# Patient Record
Sex: FEMALE | Race: WHITE | URBAN - METROPOLITAN AREA
[De-identification: names, ages, dates, MRNs, and addresses within clinical notes are randomized per-mention and may not be internally consistent; named-entity substitution may affect disease eponyms.]

---

## 2017-01-06 ENCOUNTER — PRENATAL OFFICE VISIT (OUTPATIENT)
Dept: MIDWIFE SERVICES | Facility: CLINIC | Age: 32
End: 2017-01-06
Payer: COMMERCIAL

## 2017-01-06 VITALS
OXYGEN SATURATION: 96 % | HEART RATE: 76 BPM | SYSTOLIC BLOOD PRESSURE: 106 MMHG | BODY MASS INDEX: 26.13 KG/M2 | DIASTOLIC BLOOD PRESSURE: 72 MMHG | WEIGHT: 157 LBS

## 2017-01-06 DIAGNOSIS — Z34.03 ENCOUNTER FOR SUPERVISION OF NORMAL FIRST PREGNANCY IN THIRD TRIMESTER: Primary | ICD-10-CM

## 2017-01-06 PROCEDURE — 99207 ZZC PRENATAL VISIT: CPT | Performed by: ADVANCED PRACTICE MIDWIFE

## 2017-01-10 NOTE — PROGRESS NOTES
Doing well.  No concerns.  Getting ready.  Discussed GBS and Hgb at next visit at 36 weeks.  Active fetus.   JE

## 2017-01-20 ENCOUNTER — PRENATAL OFFICE VISIT (OUTPATIENT)
Dept: MIDWIFE SERVICES | Facility: CLINIC | Age: 32
End: 2017-01-20
Payer: COMMERCIAL

## 2017-01-20 VITALS
HEART RATE: 100 BPM | BODY MASS INDEX: 26.46 KG/M2 | DIASTOLIC BLOOD PRESSURE: 73 MMHG | SYSTOLIC BLOOD PRESSURE: 109 MMHG | TEMPERATURE: 96.2 F | WEIGHT: 159 LBS

## 2017-01-20 DIAGNOSIS — Z34.03 ENCOUNTER FOR SUPERVISION OF NORMAL FIRST PREGNANCY IN THIRD TRIMESTER: Primary | ICD-10-CM

## 2017-01-20 LAB — HGB BLD-MCNC: 12.4 G/DL (ref 11.7–15.7)

## 2017-01-20 PROCEDURE — 00000218 ZZHCL STATISTIC OBHBG - HEMOGLOBIN: Performed by: ADVANCED PRACTICE MIDWIFE

## 2017-01-20 PROCEDURE — 87186 SC STD MICRODIL/AGAR DIL: CPT | Performed by: ADVANCED PRACTICE MIDWIFE

## 2017-01-20 PROCEDURE — 36416 COLLJ CAPILLARY BLOOD SPEC: CPT | Performed by: ADVANCED PRACTICE MIDWIFE

## 2017-01-20 PROCEDURE — 99207 ZZC PRENATAL VISIT: CPT | Performed by: ADVANCED PRACTICE MIDWIFE

## 2017-01-20 PROCEDURE — 87653 STREP B DNA AMP PROBE: CPT | Performed by: ADVANCED PRACTICE MIDWIFE

## 2017-01-20 NOTE — PROGRESS NOTES
35w5d  Patient feeling well. Positive fetal movement. Denies water leaking, vaginal bleeding, decreased fetal movement, contraction pain, or headaches.   Patient has FMLA paper to fill out today. Otherwise no concerns. Asking about what to do with cord prolapse at home, discussed that is very unlikely to happen. Discussed baby's position and cervical checks today as well.   Danger signs reviewed, pre-eclampsia signs and symptoms discussed.   Knows when to call triage and has phone numbers.   GBS and HGB today.   Follow up in 1 week.   Lola GARZA

## 2017-01-21 LAB
GP B STREP DNA SPEC QL NAA+PROBE: ABNORMAL
SPECIMEN SOURCE: ABNORMAL

## 2017-01-24 LAB
BACTERIA SPEC CULT: ABNORMAL
MICRO REPORT STATUS: ABNORMAL
MICROORGANISM SPEC CULT: ABNORMAL
SPECIMEN SOURCE: ABNORMAL

## 2017-01-25 PROBLEM — Z23 NEED FOR TDAP VACCINATION: Status: ACTIVE | Noted: 2017-01-25

## 2017-01-25 PROBLEM — Z34.00 SUPERVISION OF NORMAL FIRST PREGNANCY: Status: ACTIVE | Noted: 2017-01-25

## 2017-01-25 PROBLEM — O99.820 GBS (GROUP B STREPTOCOCCUS CARRIER), +RV CULTURE, CURRENTLY PREGNANT: Status: ACTIVE | Noted: 2017-01-25

## 2017-01-30 ENCOUNTER — PRENATAL OFFICE VISIT (OUTPATIENT)
Dept: MIDWIFE SERVICES | Facility: CLINIC | Age: 32
End: 2017-01-30
Payer: COMMERCIAL

## 2017-01-30 VITALS
DIASTOLIC BLOOD PRESSURE: 77 MMHG | BODY MASS INDEX: 26.79 KG/M2 | WEIGHT: 161 LBS | TEMPERATURE: 96.7 F | SYSTOLIC BLOOD PRESSURE: 116 MMHG | HEART RATE: 97 BPM

## 2017-01-30 DIAGNOSIS — Z34.03 ENCOUNTER FOR SUPERVISION OF NORMAL FIRST PREGNANCY IN THIRD TRIMESTER: Primary | ICD-10-CM

## 2017-01-30 PROCEDURE — 99207 ZZC PRENATAL VISIT: CPT | Performed by: ADVANCED PRACTICE MIDWIFE

## 2017-01-30 NOTE — PROGRESS NOTES
37w1d  Patient feeling well. Positive fetal movement. Denies water leaking, vaginal bleeding, decreased fetal movement, contraction pain, or headaches.   Discussed breastfeeding, birth control, and postpartum depression and anxiety. Patient has a family history of postpartum depression and herself has some anxiety but does not and has not ever needed treatment. Just worried about having to wait until an appointment is available for weeks. Option of getting a referral and making an appointment here for just in case or get a list of clinics that would be covered by her insurance to call to see if they have appointment available before [facility]. Patient also asking if she needs to get on a list for pelvic floor physical therapy but not having any issues and never had any issues with pelvic floor muscles. Discussed that is not routinely done and it would be recommended to just do Kegel exercises.   Discussed GBS positive.   Danger signs reviewed, pre-eclampsia signs and symptoms discussed.   Knows when to call triage and has phone numbers.   Follow up in 1 week.   Lola Rainey CNM

## 2017-02-08 ENCOUNTER — PRENATAL OFFICE VISIT (OUTPATIENT)
Dept: MIDWIFE SERVICES | Facility: CLINIC | Age: 32
End: 2017-02-08
Payer: COMMERCIAL

## 2017-02-08 VITALS
HEART RATE: 102 BPM | DIASTOLIC BLOOD PRESSURE: 79 MMHG | WEIGHT: 161.2 LBS | SYSTOLIC BLOOD PRESSURE: 113 MMHG | BODY MASS INDEX: 26.83 KG/M2

## 2017-02-08 DIAGNOSIS — Z34.03 ENCOUNTER FOR SUPERVISION OF NORMAL FIRST PREGNANCY IN THIRD TRIMESTER: Primary | ICD-10-CM

## 2017-02-08 PROCEDURE — 99207 ZZC PRENATAL VISIT: CPT | Performed by: ADVANCED PRACTICE MIDWIFE

## 2017-02-08 NOTE — PROGRESS NOTES
Feeling well, would really like to be able to know when she will go into labor but understands that it is not predictable. Baby is active, less patterned and this concerns her. Reassured her that overall movement is reassuring, not a concern if it has become less predictable in nature. No LOF or bleeding but has had an increase in discharge. No regular contractions. Baby very active throughout exam. RTC weekly. MML

## 2017-02-13 ENCOUNTER — PRENATAL OFFICE VISIT (OUTPATIENT)
Dept: MIDWIFE SERVICES | Facility: CLINIC | Age: 32
End: 2017-02-13
Payer: COMMERCIAL

## 2017-02-13 VITALS
WEIGHT: 163 LBS | HEART RATE: 90 BPM | TEMPERATURE: 96.5 F | BODY MASS INDEX: 27.12 KG/M2 | DIASTOLIC BLOOD PRESSURE: 75 MMHG | SYSTOLIC BLOOD PRESSURE: 120 MMHG

## 2017-02-13 DIAGNOSIS — Z34.03 ENCOUNTER FOR SUPERVISION OF NORMAL FIRST PREGNANCY IN THIRD TRIMESTER: Primary | ICD-10-CM

## 2017-02-13 PROCEDURE — 99207 ZZC PRENATAL VISIT: CPT | Performed by: ADVANCED PRACTICE MIDWIFE

## 2017-02-13 NOTE — PROGRESS NOTES
39w1d  Feeling well. Reports good fetal movement. Denies leaking of fluid, vaginal bleeding, regular uterine contractions, headache or other concerns. Request cervix check. Discussed postdates. RTC in 1 wk NAN

## 2017-02-13 NOTE — MR AVS SNAPSHOT
After Visit Summary   2/13/2017    Fifi Hook    MRN: [id number]           Patient Information     Date Of Birth          1985        Visit Information        Provider Department      2/13/2017 8:30 AM Kajal Santamaria APRN CNM [facility]        Today's Diagnoses     Encounter for supervision of normal first pregnancy in third trimester    -  1       Follow-ups after your visit        Your next 10 appointments already scheduled     Feb 20, 2017  8:15 AM CST   ESTABLISHED PRENATAL with AKIL Puga CNM   [facility] ([facility])    [address] [place] [address] [phone]              Who to contact     If you have questions or need follow up information about today's clinic visit or your schedule please contact [facility] directly at [phone].  Normal or non-critical lab and imaging results will be communicated to you by MyChart, letter or phone within 4 business days after the clinic has received the results. If you do not hear from us within 7 days, please contact the clinic through [employer] or phone. If you have a critical or abnormal lab result, we will notify you by phone as soon as possible.  Submit refill requests through [employer] or call your pharmacy and they will forward the refill request to us. Please allow 3 business days for your refill to be completed.          Additional Information About Your Visit        MyChart Information     [employer] gives you secure access to your electronic health record. If you see a primary care provider, you can also send messages to your care team and make appointments. If you have questions, please call your primary care clinic.  If you do not have a primary care provider, please call [phone] and they will assist you.        Care EveryWhere ID     This is your Care EveryWhere ID. This could be used by other organizations  to access your [place] medical records  [id number]        Your Vitals Were     Pulse Temperature Last Period BMI (Body Mass Index)          90 96.5  F (35.8  C) (Oral) 05/15/2016 27.12 kg/m2         Blood Pressure from Last 3 Encounters:   02/13/17 120/75   02/08/17 113/79   01/30/17 116/77    Weight from Last 3 Encounters:   02/13/17 163 lb (73.9 kg)   02/08/17 161 lb 3.2 oz (73.1 kg)   01/30/17 161 lb (73 kg)              Today, you had the following     No orders found for display       Primary Care Provider    None Specified       No primary provider on file.        Thank you!     Thank you for choosing [facility]  for your care. Our goal is always to provide you with excellent care. Hearing back from our patients is one way we can continue to improve our services. Please take a few minutes to complete the written survey that you may receive in the mail after your visit with us. Thank you!             Your Updated Medication List - Protect others around you: Learn how to safely use, store and throw away your medicines at www.disposemymeds.org.          This list is accurate as of: 2/13/17 12:10 PM.  Always use your most recent med list.                   Brand Name Dispense Instructions for use    PRENATAL 1 PO      1 tablet daily

## 2017-02-20 ENCOUNTER — TELEPHONE (OUTPATIENT)
Dept: MIDWIFE SERVICES | Facility: CLINIC | Age: 32
End: 2017-02-20

## 2017-02-20 ENCOUNTER — PRENATAL OFFICE VISIT (OUTPATIENT)
Dept: MIDWIFE SERVICES | Facility: CLINIC | Age: 32
End: 2017-02-20
Payer: COMMERCIAL

## 2017-02-20 VITALS
BODY MASS INDEX: 27.64 KG/M2 | HEIGHT: 65 IN | DIASTOLIC BLOOD PRESSURE: 85 MMHG | SYSTOLIC BLOOD PRESSURE: 116 MMHG | WEIGHT: 165.9 LBS | HEART RATE: 102 BPM

## 2017-02-20 DIAGNOSIS — Z34.03 ENCOUNTER FOR SUPERVISION OF NORMAL FIRST PREGNANCY IN THIRD TRIMESTER: ICD-10-CM

## 2017-02-20 PROCEDURE — 99207 ZZC PRENATAL VISIT: CPT | Performed by: ADVANCED PRACTICE MIDWIFE

## 2017-02-20 NOTE — PROGRESS NOTES
40w1d   Patient feeling well. Positive fetal movement. Denies water leaking, vaginal bleeding, decreased fetal movement, contraction pain, or headaches.   Patient is feeling really well still. Going to work everyday. Worried about her water breaking at work. We discussed this is more uncommon. Okay with going postdates. Thinking about asking for an IOL at 10 days past her due date.   Danger signs reviewed, pre-eclampsia signs and symptoms discussed.   Knows when to call triage and has phone numbers.   Follow up Friday for NST and ELSIE for postdates.   Lola Rainey CNM

## 2017-02-20 NOTE — TELEPHONE ENCOUNTER
Patient scheduled for appts on Friday. Left message to call clinic to confirm. Only time available for US.  Valerie Moon

## 2017-02-20 NOTE — TELEPHONE ENCOUNTER
----- Message from AKIL Puga CNJESSEE sent at 2/20/2017  8:30 AM CST -----  Hello,   This patient said she was unable to make an appointment Friday since it was full.   Could you help coordinate her getting an ELSIE postdates ultrasound, NST, and CNM visit Friday. Hopefully the on call will be able to come over quick if we are full.   Thanks, Julia

## 2017-02-20 NOTE — MR AVS SNAPSHOT
After Visit Summary   2/20/2017    Fifi Hook    MRN: [id number]           Patient Information     Date Of Birth          1985        Visit Information        Provider Department      2/20/2017 8:15 AM Lola Rainey APRN CNM [facility]        Today's Diagnoses     Encounter for supervision of normal first pregnancy in third trimester           Follow-ups after your visit        Your next 10 appointments already scheduled     Feb 24, 2017  8:00 AM CST   US OB SINGLE FOLLOW UP REPEAT with RDUS1   [facility] ([facility])    [address] [place] [address] [address]  [facility] [address] [phone]           Please bring a list of your medicines (including vitamins, minerals and over-the-counter drugs). Also, tell your doctor about any allergies you may have. Wear comfortable clothes and leave your valuables at home.  If you re less than 20 weeks drink four 8-ounce glasses of fluid an hour before your exam. If you need to empty your bladder before your exam, try to release only a little urine. Then, drink another glass of fluid.  You may have up to two family members in the exam room. If you bring a small child, an adult must be there to care for him or her.  Please call the Imaging Department at your exam site with any questions.            Feb 24, 2017  9:15 AM CST   ESTABLISHED PRENATAL with AKIL Kelley CNM   [facility] ([facility])    [address] [place] [address] [address]  [facility] [id number] [phone]            Feb 27, 2017  8:45 AM CST   ESTABLISHED PRENATAL with AKIL Puga CNM   [facility] ([facility])    [address] [place] [address] [address]  [facility] [id number] [phone]              Future tests that were ordered for you today     Open Future Orders        Priority Expected Expires Ordered    US OB Ltd One Or More Fetus  "FU/Repeat Routine 5/21/2017 2/20/2018 2/20/2017            Who to contact     If you have questions or need follow up information about today's clinic visit or your schedule please contact [facility] directly at [phone].  Normal or non-critical lab and imaging results will be communicated to you by MyChart, letter or phone within 4 business days after the clinic has received the results. If you do not hear from us within 7 days, please contact the clinic through [employer] or phone. If you have a critical or abnormal lab result, we will notify you by phone as soon as possible.  Submit refill requests through [employer] or call your pharmacy and they will forward the refill request to us. Please allow 3 business days for your refill to be completed.          Additional Information About Your Visit        [employer] Information     [employer] gives you secure access to your electronic health record. If you see a primary care provider, you can also send messages to your care team and make appointments. If you have questions, please call your primary care clinic.  If you do not have a primary care provider, please call [phone] and they will assist you.        Care EveryWhere ID     This is your Care EveryWhere ID. This could be used by other organizations to access your [facility] medical records  [id number]        Your Vitals Were     Pulse Height Last Period BMI (Body Mass Index)          102 5' 5\" (1.651 m) 05/15/2016 27.61 kg/m2         Blood Pressure from Last 3 Encounters:   02/20/17 116/85   02/13/17 120/75   02/08/17 113/79    Weight from Last 3 Encounters:   02/20/17 165 lb 14.4 oz (75.3 kg)   02/13/17 163 lb (73.9 kg)   02/08/17 161 lb 3.2 oz (73.1 kg)               Primary Care Provider    None Specified       No primary provider on file.        Thank you!     Thank you for choosing [facility]  for your care. Our goal is always to provide you with excellent care. Hearing back " from our patients is one way we can continue to improve our services. Please take a few minutes to complete the written survey that you may receive in the mail after your visit with us. Thank you!             Your Updated Medication List - Protect others around you: Learn how to safely use, store and throw away your medicines at www.disposemymeds.org.          This list is accurate as of: 2/20/17  8:47 AM.  Always use your most recent med list.                   Brand Name Dispense Instructions for use    PRENATAL 1 PO      1 tablet daily

## 2017-02-23 ENCOUNTER — TELEPHONE (OUTPATIENT)
Dept: MIDWIFE SERVICES | Facility: CLINIC | Age: 32
End: 2017-02-23

## 2017-02-23 ENCOUNTER — TELEPHONE (OUTPATIENT)
Dept: NURSING | Facility: CLINIC | Age: 32
End: 2017-02-23
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
## 2017-02-24 ENCOUNTER — TELEPHONE (OUTPATIENT)
Dept: NURSING | Facility: CLINIC | Age: 32
End: 2017-02-24

## 2017-02-24 ENCOUNTER — HOSPITAL ENCOUNTER (INPATIENT)
Facility: CLINIC | Age: 32
LOS: 4 days | Discharge: HOME | End: 2017-02-28
Attending: ADVANCED PRACTICE MIDWIFE | Admitting: OBSTETRICS & GYNECOLOGY
Payer: COMMERCIAL

## 2017-02-24 ENCOUNTER — HOSPITAL ENCOUNTER (OUTPATIENT)
Facility: CLINIC | Age: 32
Discharge: HOME | End: 2017-02-24
Attending: ADVANCED PRACTICE MIDWIFE | Admitting: ADVANCED PRACTICE MIDWIFE
Payer: COMMERCIAL

## 2017-02-24 VITALS — TEMPERATURE: 97.7 F | SYSTOLIC BLOOD PRESSURE: 118 MMHG | RESPIRATION RATE: 18 BRPM | DIASTOLIC BLOOD PRESSURE: 84 MMHG

## 2017-02-24 DIAGNOSIS — O47.9 THREATENED LABOR AT TERM: Primary | ICD-10-CM

## 2017-02-24 DIAGNOSIS — Z48.89 ENCOUNTER FOR POSTOPERATIVE CARE: Primary | ICD-10-CM

## 2017-02-24 PROCEDURE — 99213 OFFICE O/P EST LOW 20 MIN: CPT | Mod: 25 | Performed by: ADVANCED PRACTICE MIDWIFE

## 2017-02-24 PROCEDURE — 85025 COMPLETE CBC W/AUTO DIFF WBC: CPT | Performed by: ADVANCED PRACTICE MIDWIFE

## 2017-02-24 PROCEDURE — 12000030 ZZH R&B OB INTERMEDIATE UMMC

## 2017-02-24 PROCEDURE — 86900 BLOOD TYPING SEROLOGIC ABO: CPT | Performed by: ADVANCED PRACTICE MIDWIFE

## 2017-02-24 PROCEDURE — 99215 OFFICE O/P EST HI 40 MIN: CPT

## 2017-02-24 PROCEDURE — 25000128 H RX IP 250 OP 636: Performed by: ADVANCED PRACTICE MIDWIFE

## 2017-02-24 PROCEDURE — 25000132 ZZH RX MED GY IP 250 OP 250 PS 637: Performed by: ADVANCED PRACTICE MIDWIFE

## 2017-02-24 PROCEDURE — 86850 RBC ANTIBODY SCREEN: CPT | Performed by: ADVANCED PRACTICE MIDWIFE

## 2017-02-24 PROCEDURE — 86901 BLOOD TYPING SEROLOGIC RH(D): CPT | Performed by: ADVANCED PRACTICE MIDWIFE

## 2017-02-24 PROCEDURE — 59025 FETAL NON-STRESS TEST: CPT | Mod: 26 | Performed by: ADVANCED PRACTICE MIDWIFE

## 2017-02-24 PROCEDURE — 99213 OFFICE O/P EST LOW 20 MIN: CPT

## 2017-02-24 RX ORDER — ONDANSETRON 2 MG/ML
4 INJECTION INTRAMUSCULAR; INTRAVENOUS EVERY 6 HOURS PRN
Status: DISCONTINUED | OUTPATIENT
Start: 2017-02-24 | End: 2017-02-26

## 2017-02-24 RX ORDER — OXYTOCIN 10 [USP'U]/ML
10 INJECTION, SOLUTION INTRAMUSCULAR; INTRAVENOUS
Status: DISCONTINUED | OUTPATIENT
Start: 2017-02-24 | End: 2017-02-26

## 2017-02-24 RX ORDER — ONDANSETRON 2 MG/ML
4 INJECTION INTRAMUSCULAR; INTRAVENOUS EVERY 6 HOURS PRN
Status: DISCONTINUED | OUTPATIENT
Start: 2017-02-24 | End: 2017-02-24 | Stop reason: HOSPADM

## 2017-02-24 RX ORDER — OXYCODONE AND ACETAMINOPHEN 5; 325 MG/1; MG/1
1 TABLET ORAL
Status: DISCONTINUED | OUTPATIENT
Start: 2017-02-24 | End: 2017-02-26

## 2017-02-24 RX ORDER — IBUPROFEN 800 MG/1
800 TABLET, FILM COATED ORAL
Status: DISCONTINUED | OUTPATIENT
Start: 2017-02-24 | End: 2017-02-26

## 2017-02-24 RX ORDER — CARBOPROST TROMETHAMINE 250 UG/ML
250 INJECTION, SOLUTION INTRAMUSCULAR
Status: DISCONTINUED | OUTPATIENT
Start: 2017-02-24 | End: 2017-02-26

## 2017-02-24 RX ORDER — NALOXONE HYDROCHLORIDE 0.4 MG/ML
.1-.4 INJECTION, SOLUTION INTRAMUSCULAR; INTRAVENOUS; SUBCUTANEOUS
Status: DISCONTINUED | OUTPATIENT
Start: 2017-02-24 | End: 2017-02-25

## 2017-02-24 RX ORDER — HYDROXYZINE HYDROCHLORIDE 50 MG/1
100 TABLET, FILM COATED ORAL EVERY 6 HOURS PRN
Status: DISCONTINUED | OUTPATIENT
Start: 2017-02-24 | End: 2017-02-24 | Stop reason: HOSPADM

## 2017-02-24 RX ORDER — METHYLERGONOVINE MALEATE 0.2 MG/ML
200 INJECTION INTRAVENOUS
Status: DISCONTINUED | OUTPATIENT
Start: 2017-02-24 | End: 2017-02-26

## 2017-02-24 RX ORDER — HYDROXYZINE HYDROCHLORIDE 50 MG/1
50-100 TABLET, FILM COATED ORAL EVERY 6 HOURS PRN
Qty: 15 TABLET | Refills: 0 | Status: ON HOLD | OUTPATIENT
Start: 2017-02-24 | End: 2017-02-27

## 2017-02-24 RX ORDER — ACETAMINOPHEN 325 MG/1
650 TABLET ORAL EVERY 4 HOURS PRN
Status: DISCONTINUED | OUTPATIENT
Start: 2017-02-24 | End: 2017-02-26

## 2017-02-24 RX ORDER — SODIUM CHLORIDE, SODIUM LACTATE, POTASSIUM CHLORIDE, CALCIUM CHLORIDE 600; 310; 30; 20 MG/100ML; MG/100ML; MG/100ML; MG/100ML
INJECTION, SOLUTION INTRAVENOUS CONTINUOUS
Status: DISCONTINUED | OUTPATIENT
Start: 2017-02-24 | End: 2017-02-26

## 2017-02-24 RX ORDER — PENICILLIN G POTASSIUM 5000000 [IU]/1
5 INJECTION, POWDER, FOR SOLUTION INTRAMUSCULAR; INTRAVENOUS ONCE
Status: COMPLETED | OUTPATIENT
Start: 2017-02-24 | End: 2017-02-24

## 2017-02-24 RX ORDER — OXYTOCIN/0.9 % SODIUM CHLORIDE 30/500 ML
100-340 PLASTIC BAG, INJECTION (ML) INTRAVENOUS CONTINUOUS PRN
Status: COMPLETED | OUTPATIENT
Start: 2017-02-24 | End: 2017-02-25

## 2017-02-24 RX ADMIN — PENICILLIN G POTASSIUM 5 MILLION UNITS: 5000000 POWDER, FOR SOLUTION INTRAMUSCULAR; INTRAPLEURAL; INTRATHECAL; INTRAVENOUS at 23:53

## 2017-02-24 RX ADMIN — HYDROXYZINE HYDROCHLORIDE 100 MG: 50 TABLET, FILM COATED ORAL at 05:59

## 2017-02-24 NOTE — IP AVS SNAPSHOT
MRN:[id number]                      After Visit Summary   2/24/2017    Fifi Hook    MRN: [id number]           Thank you!     Thank you for choosing [facility] for your care. Our goal is always to provide you with excellent care. Hearing back from our patients is one way we can continue to improve our services. Please take a few minutes to complete the written survey that you may receive in the mail after you visit with us. Thank you!        Patient Information     Date Of Birth          1985        About your hospital stay     You were admitted on:  February 24, 2017 You last received care in the:  [facility]    You were discharged on:  February 24, 2017       Who to Call     For medical emergencies, please call 911.  For non-urgent questions about your medical care, please call your primary care provider or clinic, None          Attending Provider     Provider Specialty    Libby Smith, [facility]    [place], [facility]       Primary Care Provider    None Specified       No primary provider on file.        Your next 10 appointments already scheduled     Feb 24, 2017 12:30 PM CST   US OB SINGLE FOLLOW UP REPEAT with FKUS1   [facility]    [address] [place] [address] [phone]           Please bring a list of your medicines (including vitamins, minerals and over-the-counter drugs). Also, tell your doctor about any allergies you may have. Wear comfortable clothes and leave your valuables at home.  If you re less than 20 weeks drink four 8-ounce glasses of fluid an hour before your exam. If you need to empty your bladder before your exam, try to release only a little urine. Then, drink another glass of fluid.  You may have up to two family members in the exam room. If you bring a small child, an adult must be there to care for him or her.  Please call the Imaging Department at your exam site with  any questions.            Feb 24, 2017  2:00 PM CST   ESTABLISHED PRENATAL with RD NON STRESS TEST RM   [facility] ([facility])    [address]  [address]  [facility] [address]   [phone]            Feb 24, 2017  2:30 PM CST   ESTABLISHED PRENATAL with AKIL Kelley CNM   [facility] ([facility])    [address]  [address]  [facility] [address]   [phone]            Feb 27, 2017  8:45 AM CST   ESTABLISHED PRENATAL with AKIL Puga CNM   [facility] ([facility])    [address] [facility] [address]  [facility] [address]   [phone]              Further instructions from your care team       Discharge Instruction for Undelivered Patients      You were seen for: Labor Assessment  We Consulted: ANSHU Smith  You had (Test or Medicine):External Fetal Monitoring     Diet:   Drink 8 to 12 glasses of liquids (milk, juice, water) every day.  You may eat meals and snacks.     Activity:  Call your doctor or nurse midwife if your baby is moving less than usual.     Call your provider if you notice:  Swelling in your face or increased swelling in your hands or legs.  Headaches that are not relieved by Tylenol (acetaminophen).  Changes in your vision (blurring: seeing spots or stars.)  Nausea (sick to your stomach) and vomiting (throwing up).   Weight gain of 5 pounds or more per week.  Heartburn that doesn't go away.  Signs of bladder infection: pain when you urinate (use the toilet), need to go more often and more urgently.  The bag of broussard (rupture of membranes) breaks, or you notice leaking in your underwear.  Bright red blood in your underwear.  Abdominal (lower belly) or stomach pain.  For first baby: Contractions (tightening) less than 5 minutes apart for one hour or more.  Increase or change in vaginal discharge (note the color and amount)      Follow-up:  As  scheduled in the clinic          Pending Results     No orders found from 2/22/2017 to 2/25/2017.            Statement of Approval     Ordered          02/24/17 0556  I have reviewed and agree with all the recommendations and orders detailed in this document.  EFFECTIVE NOW     Approved and electronically signed by:  Libby Smith CNM             Admission Information     Date & Time Provider Department Dept. Phone    2/24/2017 Libby Smith CNM [facility] [phone]      Your Vitals Were     Blood Pressure Temperature Respirations Last Period          118/84 97.7  F (36.5  C) (Oral) 18 05/15/2016        [employer] Information     [employer] gives you secure access to your electronic health record. If you see a primary care provider, you can also send messages to your care team and make appointments. If you have questions, please call your primary care clinic.  If you do not have a primary care provider, please call [phone] and they will assist you.        Care EveryWhere ID     This is your Care EveryWhere ID. This could be used by other organizations to access your [facility] medical records  [id number]           Review of your medicines      START taking        Dose / Directions    hydrOXYzine 50 MG tablet   Commonly known as:  ATARAX        Dose:   mg   Take 1-2 tablets ( mg) by mouth every 6 hours as needed for other (nervousness or restlessness/sleep)   Quantity:  15 tablet   Refills:  0         CONTINUE these medicines which have NOT CHANGED        Dose / Directions    PRENATAL 1 PO        Dose:  1 tablet   1 tablet daily   Refills:  0            Where to get your medicines      These medications were sent to [facility] [address] [address]     Phone:  [phone]     hydrOXYzine 50 MG tablet                Protect others around you: Learn how to safely use, store and throw away your medicines at  www.disposemymeds.org.             Medication List: This is a list of all your medications and when to take them. Check marks below indicate your daily home schedule. Keep this list as a reference.      Medications           Morning Afternoon Evening Bedtime As Needed    hydrOXYzine 50 MG tablet   Commonly known as:  ATARAX   Take 1-2 tablets ( mg) by mouth every 6 hours as needed for other (nervousness or restlessness/sleep)   Last time this was given:  100 mg on 2/24/2017  5:59 AM                                PRENATAL 1 PO   1 tablet daily

## 2017-02-24 NOTE — IP AVS SNAPSHOT
MRN:[id number]                      After Visit Summary   2017    Fifi Hook    MRN: [id number]           Thank you!     Thank you for choosing [facility] for your care. Our goal is always to provide you with excellent care. Hearing back from our patients is one way we can continue to improve our services. Please take a few minutes to complete the written survey that you may receive in the mail after you visit with us. Thank you!        Patient Information     Date Of Birth          1985        About your hospital stay     You were [facility] on:  2017 [facility]    You were discharged on:  2017        Reason for your hospital stay       You were in the hospital for delivery of your baby                  Who to Call     For medical emergencies, please call 911.  For non-urgent questions about your medical care, please call your primary care provider or clinic, None  For questions related to your surgery, please call your surgery clinic        Attending Provider     Provider Specialty    Isamar lAdridge APRN CNM Midwives Lehmann, Molly Mae, APRN CNM MIDWIFE    Agnes Seals MD OB/Gyn       Primary Care Provider    None Specified       No primary provider on file.         When to contact your care team       - Fever >100.4  - Severe abdominal pain  - Heavy vaginal bleeding                  After Care Instructions     Activity       Review discharge instructions            Diet       Resume previous diet            Discharge Instructions - Postpartum visit       Schedule postpartum visit with your provider and return to clinic in 6 weeks.                  Follow-up Appointments     Follow Up and recommended labs and tests       Follow up in 6 weeks for postpartum visit in clinic with OB provider                  Further instructions from your care team       Postop  Birth Instructions    Activity       Do  not lift more than 10 pounds for 6 weeks after surgery.  Ask family and friends for help when you need it.    No driving until you have stopped taking your pain medications (usually two weeks after surgery).    No heavy exercise or activity for 6 weeks.  Don't do anything that will put a strain on your surgery site.    Don't strain when using the toilet.  Your care team may prescribe a stool softener if you have problems with your bowel movements.     To care for your incision:       Keep the incision clean and dry.    Do not soak your incision in water. No swimming or hot tubs until it has fully healed. You may soak in the bathtub if the water level is below your incision.    Do not use peroxide, gel, cream, lotion, or ointment on your incision.    Adjust your clothes to avoid pressure on your surgery site (check the elastic in your underwear for example).     You may see a small amount of clear or pink drainage and this is normal.  Check with your health care provider:       If the drainage increases or has an odor.    If the incision reddens, you have swelling, or develop a rash.    If you have increased pain and the medicine we prescribed doesn't help.    If you have a fever above 100.4 F (38 C) with or without chills when placing thermometer under your tongue.   The area around your incision (surgery wound), will feel numb.  This is normal. The numbness should go away in less than a year.     Keep your hands clean:  Always wash your hands before touching your incision (surgery wound). This helps reduce your risk of infection. If your hands aren't dirty, you may use an alcohol hand-rub to clean your hands. Keep your nails clean and short.    Call your healthcare provider if you have any of these symptoms:       You soak a sanitary pad with blood within 1 hour, or you see blood clots larger than a golf ball.    Bleeding that lasts more than 6 weeks.    Vaginal discharge that smells bad.    Severe pain, cramping  or tenderness in your lower belly area.    A need to urinate more frequently (use the toilet more often), more urgently (use the toilet very quickly), or it burns when you urinate.    Nausea and vomiting.    Redness, swelling or pain around a vein in your leg.    Problems breastfeeding or a red or painful area on your breast.    Chest pain and cough or are gasping for air.    Problems with coping with sadness, anxiety or depression. If you have concerns about hurting yourself or the baby, call your provider immediately.      You have questions or concerns after you return home.                  Pending Results     No orders found from 2/22/2017 to 2/25/2017.            Statement of Approval     Ordered          02/28/17 1005  I have reviewed and agree with all the recommendations and orders detailed in this document.  EFFECTIVE NOW     Approved and electronically signed by:  Deisy Oconnell MD             Admission Information     Date & Time Provider Department Dept. Phone    2/24/2017 Agnes Seals MD [facility] [phone]      Your Vitals Were     Blood Pressure Pulse Temperature Respirations Last Period Pulse Oximetry    120/88 93 98.2  F (36.8  C) (Oral) 18 05/15/2016 98%      MyChart Information     [facility] gives you secure access to your electronic health record. If you see a primary care provider, you can also send messages to your care team and make appointments. If you have questions, please call your primary care clinic.  If you do not have a primary care provider, please call [phone] and they will assist you.        Care EveryWhere ID     This is your Care EveryWhere ID. This could be used by other organizations to access your [facility] medical records  [id number]           Review of your medicines      START taking        Dose / Directions    acetaminophen 325 MG tablet   Commonly known as:  TYLENOL        Dose:  650 mg   Take 2 tablets (650 mg) by mouth every 4 hours as needed for  other (surgical pain)   Quantity:  50 tablet   Refills:  0       ferrous sulfate 325 (65 FE) MG tablet   Commonly known as:  IRON        Dose:  325 mg   Take 1 tablet (325 mg) by mouth daily (with breakfast)   Quantity:  60 tablet   Refills:  0       ibuprofen 600 MG tablet   Commonly known as:  ADVIL/MOTRIN        Dose:  600 mg   Take 1 tablet (600 mg) by mouth every 6 hours as needed for other (cramping)   Quantity:  40 tablet   Refills:  0       oxyCODONE 5 MG IR tablet   Commonly known as:  ROXICODONE        Dose:  5-10 mg   Take 1-2 tablets (5-10 mg) by mouth every 4 hours as needed for moderate to severe pain   Quantity:  20 tablet   Refills:  0       senna-docusate 8.6-50 MG per tablet   Commonly known as:  SENOKOT-S;PERICOLACE        Dose:  1-2 tablet   Take 1-2 tablets by mouth 2 times daily   Quantity:  60 tablet   Refills:  0         CONTINUE these medicines which have NOT CHANGED        Dose / Directions    PRENATAL 1 PO        Dose:  1 tablet   1 tablet daily   Refills:  0         STOP taking     hydrOXYzine 50 MG tablet   Commonly known as:  ATARAX                Where to get your medicines      These medications were sent to [facility] [place], [place] - [address]  [address] [address]     Phone:  [phone]     acetaminophen 325 MG tablet    ferrous sulfate 325 (65 FE) MG tablet    ibuprofen 600 MG tablet    senna-docusate 8.6-50 MG per tablet         Some of these will need a paper prescription and others can be bought over the counter. Ask your nurse if you have questions.     Bring a paper prescription for each of these medications     oxyCODONE 5 MG IR tablet                Protect others around you: Learn how to safely use, store and throw away your medicines at www.disposemymeds.org.             Medication List: This is a list of all your medications and when to take them. Check marks below indicate your daily home schedule. Keep this list as a  reference.      Medications           Morning Afternoon Evening Bedtime As Needed    acetaminophen 325 MG tablet   Commonly known as:  TYLENOL   Take 2 tablets (650 mg) by mouth every 4 hours as needed for other (surgical pain)   Last time this was given:  975 mg on 2/28/2017  6:30 AM                                ferrous sulfate 325 (65 FE) MG tablet   Commonly known as:  IRON   Take 1 tablet (325 mg) by mouth daily (with breakfast)                                ibuprofen 600 MG tablet   Commonly known as:  ADVIL/MOTRIN   Take 1 tablet (600 mg) by mouth every 6 hours as needed for other (cramping)   Last time this was given:  800 mg on 2/28/2017  5:20 AM                                oxyCODONE 5 MG IR tablet   Commonly known as:  ROXICODONE   Take 1-2 tablets (5-10 mg) by mouth every 4 hours as needed for moderate to severe pain   Last time this was given:  5 mg on 2/28/2017 12:07 AM                                PRENATAL 1 PO   1 tablet daily                                senna-docusate 8.6-50 MG per tablet   Commonly known as:  SENOKOT-S;PERICOLACE   Take 1-2 tablets by mouth 2 times daily   Last time this was given:  2 tablets on 2/27/2017  7:37 PM

## 2017-02-24 NOTE — PROGRESS NOTES
Fifi Hook is a 31 year old female,  ,         Patient's last menstrual period was 05/15/2016.. Estimated Date of Delivery: 2017 is calculated from early U/S and LMP.      [facility] for evaluation of uterine contractions    HPI Contractions started last night and have continued to become stronger. She has gotten very little sleep.  Contractions have been frequent since they started.       PRENATAL COURSE  Prenatal care began at 9 wks gestation for a total of 10 prenatal visits.  Total wt gain 30 lbs  Prenatal vital signs WNL  Prenatal course was essentially uncomplicated    HISTORIES  No Known Allergies  Past Medical History   Diagnosis Date     Patient denies medical problems      Past Surgical History   Procedure Laterality Date     C oral surgery procedure       Extraction(s) dental       Family History   Problem Relation Age of Onset     Depression Mother      Depression Maternal Grandmother      Asthma Brother      DIABETES Maternal Uncle      Breast Cancer No family hx of      Colon Cancer No family hx of      Social History   Substance Use Topics     Smoking status: Never Smoker     Smokeless tobacco: Never Used     Alcohol use No       LABS:       Lab Results   Component Value Date    ABO A 2016       Lab Results   Component Value Date    RH Pos 2016     Rhogam not indicated  Rubella immune   Treponema Pallidum Antibody  Negative    HIV    Non-reactive  Lab Results   Component Value Date    HGB 12.4 2017      Lab Results   Component Value Date    HEPBANG negative 2016     Lab Results   Component Value Date    GBS  2017     Positive  Positive: GBS DNA detected, presumed positive for GBS.   Assay performed on incubated broth culture of specimen using [facility] real-time   PCR.         ROS  C: NEGATIVE for fever, chills, change in weight  I: NEGATIVE for worrisome rashes, moles or lesions  E/M: NEGATIVE for ear,  mouth and throat problems  R: NEGATIVE for significant cough or SOB  CV: NEGATIVE for chest pain, palpitations or peripheral edema  GI: NEGATIVE for nausea, abdominal pain, heartburn, or change in bowel habits  : NEGATIVE for frequency, dysuria, or hematuria  PSYCHIATRIC:  NEGATIVE for depression, anxiety or other mental health concerns      PHYSICAL EXAM:  BP (!) 131/91  Temp 97.7  F (36.5  C) (Oral)  Resp 18  LMP 05/15/2016  BP retaken:  118/84 WNL.  1st BP taken when she arrived and was distressed with contractions.  Denies any headache, visual disturbances or epigastric pain.    General appearance healthy, alert, active, mild distress and cooperative  Neuro:  denies headache and visual disturbances  Psych: Mentation normal and bright   Legs: 2+/2+, no clonus, no edema       Abdomen: gravid, single vertex fetus, non-tender, EFW 8 lbs. Pt is sherin every 2-3 minutes, lasting 40-60 seconds and palpates mild    FETAL HEART TONES: baseline 130 with moderate FHR variability and accelerations.  No decelerations present.     MEMBRANES: Membranes are intact    PELVIC EXAM: 2/ 70% / -3  BLOODY SHOW:: yes small amount with exam    ASSESSMENT:  IUP @ 40w5d  gestation in in latent labor  NST  reactive        PLAN:  Discussed options of staying going home in early labor or option of therapeutic sleep here and the hospital with possible pitocin augmentation.  She would like to go home.  Medications given vistaril one dose, Prescriptions given - vistaril   I will let Friday CNM know that they are in early labor.  They will keep in touch touch. They do not plan to go to [facility] for ELSIE today as they are in early labor.      Libby Smith, DNP, APRN, CNM

## 2017-02-24 NOTE — TELEPHONE ENCOUNTER
"Call Type: Triage Call    Presenting Problem: Patient is \"41 weeks pregnant,\" BRANDON is  02/19/2017. Patient's is having contractions that are 2-3 minutes  apart. Triager called out to answering service, left message for on  call provider to call patient at [phone]. Triaged for pregnancy:  signs of labor, 37 weeks or greater/Disposition to call provider  immediately.  Triage Note:  Guideline Title: Pregnancy: Signs of Labor, 37 Weeks or Greater  Recommended Disposition: Call Provider Immediately  Original Inclination: Wanted to speak with a nurse  Override Disposition:  Intended Action: Call PCP/HCP  Physician Contacted: No  First childbirth with regular contractions for 1 hour and contractions are  feeling stronger and closer together. ?  YES  New or worsening signs and symptoms that may indicate shock ? NO  Feeling of baby coming or wanting to push (urge to bear down) ? NO  Umbilical cord or any part of the baby (head, bottom, arm or leg) at the opening  of the vagina ? NO  Gestation 20 to 37 weeks ? NO  Gush or leakage of green or green-tinged or port-wine colored fluid (reddish and  watery) from the vagina ? NO  Decreased fetal movement (less than 10 kicks/movements within two hours or a  significant change in usual pattern compared to previous days) ? NO  No relief between contractions ? NO  Continuous bright red vaginal bleeding for more than 15 minutes (more than  spotting) ? NO  Physician Instructions:  Care Advice: Lie on left side, if possible.  CAUTIONS  SYMPTOM / CONDITION MANAGEMENT  See another provider immediately if unable to talk with your provider  within 1 hour. Follow the directions from your provider's on call resource  if you are unable to speak to your provider directly.  You may be directed  to go to the hospital's Labor & Delivery department for evaluation. Another  adult should drive.  May have clear liquids (such as water, clear fruit juices without pulp,  soda, tea or coffee without dairy " or non-dairy creamer, clear broth or  bouillon, oral hydration solution, or plain gelatin, fruit ices/popsicles,  hard candy) but do not eat solid foods before talking with your provider.  Call  if any of these occur: profuse bright red vaginal bleeding  continuous (without relaxation) abdominal pain  the umbilical cord or any fetal part in vagina  bag of broussard coming through vagina  feeling of wanting to push or have a bowel movement.

## 2017-02-24 NOTE — PLAN OF CARE
Data: [facility] at 0448.   Reason for maternal/fetal assessment per patient is Contractions (stronger at midnight)  . Patient is a . Prenatal record reviewed.      Obstetric History       T0      TAB0   SAB0   E0   M0   L0       # Outcome Date GA Lbr Quinton/2nd Weight Sex Delivery Anes PTL Lv   1 Current                  Medical History:   Past Medical History   Diagnosis Date     Patient denies medical problems    . Gestational Age 40w5d. VSS. Cervix: dilated to 2.  Fetal movement present. Patient denies backache, vaginal discharge, pelvic pressure, UTI symptoms, GI problems, bloody show, vaginal bleeding, edema, headache, visual disturbances, epigastric or URQ pain, abdominal pain, rupture of membranes. Support person is present.  Action: Verbal consent for EFM. Triage assessment completed. EFM applied for fetal well being. Uterine assessment pt sherin every 2-3 minutes. Fetal assessment: Presumed adequate fetal oxygenation documented (see flow record). Patient education pamphlets given on fetal movement counts and when to call the clinic with signs of labor. Patient instructed to report change in fetal movement, vaginal leaking of fluid or bleeding, abdominal pain, or any concerns related to the pregnancy to her nurse/physician.   Response: Dr. Smith informed of pt's arrival, contraction pattern, baby's initially with minimal variability but after postion change and oral fluids became reactive. Plan per provider is to discharge pt home with visteral. Patient verbalized understanding of education and verbalized agreement with plan. Discharged ambulatory at 0613.

## 2017-02-24 NOTE — IP AVS SNAPSHOT
[facility]    [address]    [place] [address]    Phone:  [phone]                                       After Visit Summary   2/24/2017    Fifi Hook    MRN: [id number]           After Visit Summary Signature Page     I have received my discharge instructions, and my questions have been answered. I have discussed any challenges I see with this plan with the nurse or doctor.    ..........................................................................................................................................  Patient/Patient Representative Signature      ..........................................................................................................................................  Patient Representative Print Name and Relationship to Patient    ..................................................               ................................................  Date                                            Time    ..........................................................................................................................................  Reviewed by Signature/Title    ...................................................              ..............................................  Date                                                            Time

## 2017-02-24 NOTE — IP AVS SNAPSHOT
[facility]    [address] [facility] [address]    Phone:  [phone]                                       After Visit Summary   2/24/2017    Fifi Hook    MRN: [id number]           After Visit Summary Signature Page     I have received my discharge instructions, and my questions have been answered. I have discussed any challenges I see with this plan with the nurse or doctor.    ..........................................................................................................................................  Patient/Patient Representative Signature      ..........................................................................................................................................  Patient Representative Print Name and Relationship to Patient    ..................................................               ................................................  Date                                            Time    ..........................................................................................................................................  Reviewed by Signature/Title    ...................................................              ..............................................  Date                                                            Time

## 2017-02-24 NOTE — DISCHARGE INSTRUCTIONS
Discharge Instruction for Undelivered Patients      You were seen for: Labor Assessment  We Consulted: ANSHU Smith  You had (Test or Medicine):External Fetal Monitoring     Diet:   Drink 8 to 12 glasses of liquids (milk, juice, water) every day.  You may eat meals and snacks.     Activity:  Call your doctor or nurse midwife if your baby is moving less than usual.     Call your provider if you notice:  Swelling in your face or increased swelling in your hands or legs.  Headaches that are not relieved by Tylenol (acetaminophen).  Changes in your vision (blurring: seeing spots or stars.)  Nausea (sick to your stomach) and vomiting (throwing up).   Weight gain of 5 pounds or more per week.  Heartburn that doesn't go away.  Signs of bladder infection: pain when you urinate (use the toilet), need to go more often and more urgently.  The bag of broussard (rupture of membranes) breaks, or you notice leaking in your underwear.  Bright red blood in your underwear.  Abdominal (lower belly) or stomach pain.  For first baby: Contractions (tightening) less than 5 minutes apart for one hour or more.  Increase or change in vaginal discharge (note the color and amount)      Follow-up:  As scheduled in the clinic

## 2017-02-25 ENCOUNTER — SURGERY (OUTPATIENT)
Age: 32
End: 2017-02-25

## 2017-02-25 ENCOUNTER — ANESTHESIA (OUTPATIENT)
Dept: OBGYN | Facility: CLINIC | Age: 32
End: 2017-02-25
Payer: COMMERCIAL

## 2017-02-25 ENCOUNTER — ANESTHESIA EVENT (OUTPATIENT)
Dept: OBGYN | Facility: CLINIC | Age: 32
End: 2017-02-25
Payer: COMMERCIAL

## 2017-02-25 PROBLEM — Z48.89 ENCOUNTER FOR POSTOPERATIVE CARE: Status: ACTIVE | Noted: 2017-02-25

## 2017-02-25 LAB
ABO + RH BLD: NORMAL
BASOPHILS # BLD AUTO: 0.1 10E9/L (ref 0–0.2)
BASOPHILS NFR BLD AUTO: 0.4 %
BLD GP AB SCN SERPL QL: NORMAL
BLOOD BANK CMNT PATIENT-IMP: NORMAL
DIFFERENTIAL METHOD BLD: ABNORMAL
EOSINOPHIL # BLD AUTO: 0.1 10E9/L (ref 0–0.7)
EOSINOPHIL NFR BLD AUTO: 0.9 %
ERYTHROCYTE [DISTWIDTH] IN BLOOD BY AUTOMATED COUNT: 13.5 % (ref 10–15)
HCT VFR BLD AUTO: 38 % (ref 35–47)
HGB BLD-MCNC: 10.8 G/DL (ref 11.7–15.7)
IMM GRANULOCYTES # BLD: 0.1 10E9/L (ref 0–0.4)
IMM GRANULOCYTES NFR BLD: 0.5 %
LYMPHOCYTES # BLD AUTO: 2.2 10E9/L (ref 0.8–5.3)
LYMPHOCYTES NFR BLD AUTO: 19.1 %
MCH RBC QN AUTO: 24.2 PG (ref 26.5–33)
MCHC RBC AUTO-ENTMCNC: 28.4 G/DL (ref 31.5–36.5)
MCV RBC AUTO: 85 FL (ref 78–100)
MONOCYTES # BLD AUTO: 1 10E9/L (ref 0–1.3)
MONOCYTES NFR BLD AUTO: 8.4 %
NEUTROPHILS # BLD AUTO: 8 10E9/L (ref 1.6–8.3)
NEUTROPHILS NFR BLD AUTO: 70.7 %
NRBC # BLD AUTO: 0 10*3/UL
NRBC BLD AUTO-RTO: 0 /100
PLATELET # BLD AUTO: 187 10E9/L (ref 150–450)
RBC # BLD AUTO: 4.46 10E12/L (ref 3.8–5.2)
SPECIMEN EXP DATE BLD: NORMAL
SPECIMEN EXP DATE BLD: NORMAL
WBC # BLD AUTO: 11.3 10E9/L (ref 4–11)

## 2017-02-25 PROCEDURE — 36000057 ZZH SURGERY LEVEL 3 1ST 30 MIN - UMMC: Performed by: OBSTETRICS & GYNECOLOGY

## 2017-02-25 PROCEDURE — 71000012 ZZH RECOVERY PHASE 1 LEVEL 1 FIRST HR: Performed by: OBSTETRICS & GYNECOLOGY

## 2017-02-25 PROCEDURE — 25000128 H RX IP 250 OP 636: Performed by: ADVANCED PRACTICE MIDWIFE

## 2017-02-25 PROCEDURE — 99215 OFFICE O/P EST HI 40 MIN: CPT

## 2017-02-25 PROCEDURE — 25800025 ZZH RX 258: Performed by: OBSTETRICS & GYNECOLOGY

## 2017-02-25 PROCEDURE — 25000125 ZZHC RX 250: Performed by: NURSE ANESTHETIST, CERTIFIED REGISTERED

## 2017-02-25 PROCEDURE — 25000128 H RX IP 250 OP 636: Performed by: OBSTETRICS & GYNECOLOGY

## 2017-02-25 PROCEDURE — 37000009 ZZH ANESTHESIA TECHNICAL FEE, EACH ADDTL 15 MIN: Performed by: OBSTETRICS & GYNECOLOGY

## 2017-02-25 PROCEDURE — 59510 CESAREAN DELIVERY: CPT | Mod: GC | Performed by: OBSTETRICS & GYNECOLOGY

## 2017-02-25 PROCEDURE — 25000125 ZZHC RX 250: Performed by: ANESTHESIOLOGY

## 2017-02-25 PROCEDURE — 36000059 ZZH SURGERY LEVEL 3 EA 15 ADDTL MIN UMMC: Performed by: OBSTETRICS & GYNECOLOGY

## 2017-02-25 PROCEDURE — C9290 INJ, BUPIVACAINE LIPOSOME: HCPCS | Performed by: ANESTHESIOLOGY

## 2017-02-25 PROCEDURE — 12000030 ZZH R&B OB INTERMEDIATE UMMC

## 2017-02-25 PROCEDURE — S0020 INJECTION, BUPIVICAINE HYDRO: HCPCS | Performed by: ANESTHESIOLOGY

## 2017-02-25 PROCEDURE — 25000125 ZZHC RX 250: Performed by: ADVANCED PRACTICE MIDWIFE

## 2017-02-25 PROCEDURE — 40000170 ZZH STATISTIC PRE-PROCEDURE ASSESSMENT II: Performed by: OBSTETRICS & GYNECOLOGY

## 2017-02-25 PROCEDURE — 37000008 ZZH ANESTHESIA TECHNICAL FEE, 1ST 30 MIN: Performed by: OBSTETRICS & GYNECOLOGY

## 2017-02-25 PROCEDURE — 25000132 ZZH RX MED GY IP 250 OP 250 PS 637: Performed by: OBSTETRICS & GYNECOLOGY

## 2017-02-25 PROCEDURE — 25800025 ZZH RX 258: Performed by: ADVANCED PRACTICE MIDWIFE

## 2017-02-25 PROCEDURE — 27210794 ZZH OR GENERAL SUPPLY STERILE: Performed by: OBSTETRICS & GYNECOLOGY

## 2017-02-25 PROCEDURE — 27110028 ZZH OR GENERAL SUPPLY NON-STERILE: Performed by: OBSTETRICS & GYNECOLOGY

## 2017-02-25 PROCEDURE — 25000128 H RX IP 250 OP 636: Performed by: ANESTHESIOLOGY

## 2017-02-25 PROCEDURE — 71000013 ZZH RECOVERY PHASE 1 LEVEL 1 EA ADDTL HR: Performed by: OBSTETRICS & GYNECOLOGY

## 2017-02-25 PROCEDURE — 25000125 ZZHC RX 250

## 2017-02-25 PROCEDURE — 10907ZC DRAINAGE OF AMNIOTIC FLUID, THERAPEUTIC FROM PRODUCTS OF CONCEPTION, VIA NATURAL OR ARTIFICIAL OPENING: ICD-10-PCS | Performed by: OBSTETRICS & GYNECOLOGY

## 2017-02-25 PROCEDURE — C1765 ADHESION BARRIER: HCPCS | Performed by: OBSTETRICS & GYNECOLOGY

## 2017-02-25 PROCEDURE — 25000128 H RX IP 250 OP 636: Performed by: NURSE ANESTHETIST, CERTIFIED REGISTERED

## 2017-02-25 RX ORDER — AMOXICILLIN 250 MG
1-2 CAPSULE ORAL 2 TIMES DAILY
Status: DISCONTINUED | OUTPATIENT
Start: 2017-02-25 | End: 2017-02-28 | Stop reason: HOSPADM

## 2017-02-25 RX ORDER — DIPHENHYDRAMINE HYDROCHLORIDE 50 MG/ML
25 INJECTION INTRAMUSCULAR; INTRAVENOUS EVERY 6 HOURS PRN
Status: DISCONTINUED | OUTPATIENT
Start: 2017-02-25 | End: 2017-02-28 | Stop reason: HOSPADM

## 2017-02-25 RX ORDER — CEFAZOLIN SODIUM 2 G/100ML
2 INJECTION, SOLUTION INTRAVENOUS
Status: COMPLETED | OUTPATIENT
Start: 2017-02-25 | End: 2017-02-25

## 2017-02-25 RX ORDER — NALOXONE HYDROCHLORIDE 0.4 MG/ML
.1-.4 INJECTION, SOLUTION INTRAMUSCULAR; INTRAVENOUS; SUBCUTANEOUS
Status: DISCONTINUED | OUTPATIENT
Start: 2017-02-25 | End: 2017-02-26

## 2017-02-25 RX ORDER — HYDROMORPHONE HYDROCHLORIDE 1 MG/ML
.3-.5 INJECTION, SOLUTION INTRAMUSCULAR; INTRAVENOUS; SUBCUTANEOUS EVERY 30 MIN PRN
Status: DISCONTINUED | OUTPATIENT
Start: 2017-02-25 | End: 2017-02-28 | Stop reason: HOSPADM

## 2017-02-25 RX ORDER — ACETAMINOPHEN 325 MG/1
650 TABLET ORAL EVERY 4 HOURS PRN
Status: DISCONTINUED | OUTPATIENT
Start: 2017-02-28 | End: 2017-02-28 | Stop reason: HOSPADM

## 2017-02-25 RX ORDER — LIDOCAINE 40 MG/G
CREAM TOPICAL
Status: DISCONTINUED | OUTPATIENT
Start: 2017-02-25 | End: 2017-02-26

## 2017-02-25 RX ORDER — BUPIVACAINE HYDROCHLORIDE 7.5 MG/ML
INJECTION, SOLUTION INTRASPINAL PRN
Status: DISCONTINUED | OUTPATIENT
Start: 2017-02-25 | End: 2017-02-25

## 2017-02-25 RX ORDER — OXYTOCIN/0.9 % SODIUM CHLORIDE 30/500 ML
340 PLASTIC BAG, INJECTION (ML) INTRAVENOUS CONTINUOUS PRN
Status: DISCONTINUED | OUTPATIENT
Start: 2017-02-25 | End: 2017-02-28 | Stop reason: HOSPADM

## 2017-02-25 RX ORDER — NALOXONE HYDROCHLORIDE 0.4 MG/ML
.1-.4 INJECTION, SOLUTION INTRAMUSCULAR; INTRAVENOUS; SUBCUTANEOUS
Status: DISCONTINUED | OUTPATIENT
Start: 2017-02-25 | End: 2017-02-28 | Stop reason: HOSPADM

## 2017-02-25 RX ORDER — OXYTOCIN 10 [USP'U]/ML
10 INJECTION, SOLUTION INTRAMUSCULAR; INTRAVENOUS
Status: DISCONTINUED | OUTPATIENT
Start: 2017-02-25 | End: 2017-02-28 | Stop reason: HOSPADM

## 2017-02-25 RX ORDER — CEFAZOLIN SODIUM 1 G/3ML
1 INJECTION, POWDER, FOR SOLUTION INTRAMUSCULAR; INTRAVENOUS
Status: DISCONTINUED | OUTPATIENT
Start: 2017-02-25 | End: 2017-02-25 | Stop reason: HOSPADM

## 2017-02-25 RX ORDER — MORPHINE SULFATE 1 MG/ML
INJECTION, SOLUTION EPIDURAL; INTRATHECAL; INTRAVENOUS PRN
Status: DISCONTINUED | OUTPATIENT
Start: 2017-02-25 | End: 2017-02-25

## 2017-02-25 RX ORDER — ONDANSETRON 2 MG/ML
4 INJECTION INTRAMUSCULAR; INTRAVENOUS EVERY 6 HOURS PRN
Status: DISCONTINUED | OUTPATIENT
Start: 2017-02-25 | End: 2017-02-28 | Stop reason: HOSPADM

## 2017-02-25 RX ORDER — FENTANYL CITRATE 50 UG/ML
25-50 INJECTION, SOLUTION INTRAMUSCULAR; INTRAVENOUS
Status: DISCONTINUED | OUTPATIENT
Start: 2017-02-25 | End: 2017-02-25 | Stop reason: HOSPADM

## 2017-02-25 RX ORDER — OXYTOCIN/0.9 % SODIUM CHLORIDE 30/500 ML
1-24 PLASTIC BAG, INJECTION (ML) INTRAVENOUS CONTINUOUS
Status: DISCONTINUED | OUTPATIENT
Start: 2017-02-25 | End: 2017-02-26

## 2017-02-25 RX ORDER — EPHEDRINE SULFATE 50 MG/ML
5 INJECTION, SOLUTION INTRAMUSCULAR; INTRAVENOUS; SUBCUTANEOUS
Status: DISCONTINUED | OUTPATIENT
Start: 2017-02-25 | End: 2017-02-26

## 2017-02-25 RX ORDER — LANOLIN 100 %
OINTMENT (GRAM) TOPICAL
Status: DISCONTINUED | OUTPATIENT
Start: 2017-02-25 | End: 2017-02-28 | Stop reason: HOSPADM

## 2017-02-25 RX ORDER — BISACODYL 10 MG
10 SUPPOSITORY, RECTAL RECTAL DAILY PRN
Status: DISCONTINUED | OUTPATIENT
Start: 2017-02-27 | End: 2017-02-28 | Stop reason: HOSPADM

## 2017-02-25 RX ORDER — BUPIVACAINE HYDROCHLORIDE AND EPINEPHRINE 2.5; 5 MG/ML; UG/ML
INJECTION, SOLUTION INFILTRATION; PERINEURAL PRN
Status: DISCONTINUED | OUTPATIENT
Start: 2017-02-25 | End: 2017-02-25

## 2017-02-25 RX ORDER — ONDANSETRON 4 MG/1
4 TABLET, ORALLY DISINTEGRATING ORAL EVERY 30 MIN PRN
Status: DISCONTINUED | OUTPATIENT
Start: 2017-02-25 | End: 2017-02-25 | Stop reason: HOSPADM

## 2017-02-25 RX ORDER — OXYCODONE HYDROCHLORIDE 5 MG/1
5-10 TABLET ORAL
Status: DISCONTINUED | OUTPATIENT
Start: 2017-02-25 | End: 2017-02-28 | Stop reason: HOSPADM

## 2017-02-25 RX ORDER — NALBUPHINE HYDROCHLORIDE 10 MG/ML
2.5-5 INJECTION, SOLUTION INTRAMUSCULAR; INTRAVENOUS; SUBCUTANEOUS EVERY 6 HOURS PRN
Status: DISCONTINUED | OUTPATIENT
Start: 2017-02-25 | End: 2017-02-26

## 2017-02-25 RX ORDER — IBUPROFEN 400 MG/1
400-800 TABLET, FILM COATED ORAL EVERY 6 HOURS PRN
Status: DISCONTINUED | OUTPATIENT
Start: 2017-02-25 | End: 2017-02-28 | Stop reason: HOSPADM

## 2017-02-25 RX ORDER — HYDROMORPHONE HYDROCHLORIDE 1 MG/ML
.3-.5 INJECTION, SOLUTION INTRAMUSCULAR; INTRAVENOUS; SUBCUTANEOUS EVERY 5 MIN PRN
Status: DISCONTINUED | OUTPATIENT
Start: 2017-02-25 | End: 2017-02-25 | Stop reason: HOSPADM

## 2017-02-25 RX ORDER — METHYLERGONOVINE MALEATE 0.2 MG/ML
200 INJECTION INTRAVENOUS
Status: DISCONTINUED | OUTPATIENT
Start: 2017-02-25 | End: 2017-02-28 | Stop reason: HOSPADM

## 2017-02-25 RX ORDER — KETOROLAC TROMETHAMINE 30 MG/ML
30 INJECTION, SOLUTION INTRAMUSCULAR; INTRAVENOUS EVERY 6 HOURS
Status: COMPLETED | OUTPATIENT
Start: 2017-02-25 | End: 2017-02-26

## 2017-02-25 RX ORDER — OXYTOCIN/0.9 % SODIUM CHLORIDE 30/500 ML
100 PLASTIC BAG, INJECTION (ML) INTRAVENOUS CONTINUOUS
Status: DISCONTINUED | OUTPATIENT
Start: 2017-02-25 | End: 2017-02-28 | Stop reason: HOSPADM

## 2017-02-25 RX ORDER — SODIUM CHLORIDE, SODIUM LACTATE, POTASSIUM CHLORIDE, CALCIUM CHLORIDE 600; 310; 30; 20 MG/100ML; MG/100ML; MG/100ML; MG/100ML
INJECTION, SOLUTION INTRAVENOUS CONTINUOUS
Status: DISCONTINUED | OUTPATIENT
Start: 2017-02-25 | End: 2017-02-25 | Stop reason: HOSPADM

## 2017-02-25 RX ORDER — DIPHENHYDRAMINE HCL 25 MG
25 CAPSULE ORAL EVERY 6 HOURS PRN
Status: DISCONTINUED | OUTPATIENT
Start: 2017-02-25 | End: 2017-02-28 | Stop reason: HOSPADM

## 2017-02-25 RX ORDER — SIMETHICONE 80 MG
80 TABLET,CHEWABLE ORAL 4 TIMES DAILY PRN
Status: DISCONTINUED | OUTPATIENT
Start: 2017-02-25 | End: 2017-02-28 | Stop reason: HOSPADM

## 2017-02-25 RX ORDER — NALOXONE HYDROCHLORIDE 0.4 MG/ML
.1-.4 INJECTION, SOLUTION INTRAMUSCULAR; INTRAVENOUS; SUBCUTANEOUS
Status: DISCONTINUED | OUTPATIENT
Start: 2017-02-25 | End: 2017-02-25

## 2017-02-25 RX ORDER — CARBOPROST TROMETHAMINE 250 UG/ML
250 INJECTION, SOLUTION INTRAMUSCULAR
Status: DISCONTINUED | OUTPATIENT
Start: 2017-02-25 | End: 2017-02-28 | Stop reason: HOSPADM

## 2017-02-25 RX ORDER — OXYTOCIN/0.9 % SODIUM CHLORIDE 30/500 ML
PLASTIC BAG, INJECTION (ML) INTRAVENOUS
Status: DISCONTINUED
Start: 2017-02-25 | End: 2017-02-25 | Stop reason: WASHOUT

## 2017-02-25 RX ORDER — PROCHLORPERAZINE 25 MG
25 SUPPOSITORY, RECTAL RECTAL EVERY 12 HOURS PRN
Status: DISCONTINUED | OUTPATIENT
Start: 2017-02-25 | End: 2017-02-28 | Stop reason: HOSPADM

## 2017-02-25 RX ORDER — ONDANSETRON 2 MG/ML
4 INJECTION INTRAMUSCULAR; INTRAVENOUS EVERY 30 MIN PRN
Status: DISCONTINUED | OUTPATIENT
Start: 2017-02-25 | End: 2017-02-25 | Stop reason: HOSPADM

## 2017-02-25 RX ORDER — LIDOCAINE HYDROCHLORIDE AND EPINEPHRINE 15; 5 MG/ML; UG/ML
3 INJECTION, SOLUTION EPIDURAL
Status: COMPLETED | OUTPATIENT
Start: 2017-02-25 | End: 2017-02-25

## 2017-02-25 RX ORDER — DEXTROSE, SODIUM CHLORIDE, SODIUM LACTATE, POTASSIUM CHLORIDE, AND CALCIUM CHLORIDE 5; .6; .31; .03; .02 G/100ML; G/100ML; G/100ML; G/100ML; G/100ML
INJECTION, SOLUTION INTRAVENOUS CONTINUOUS
Status: DISCONTINUED | OUTPATIENT
Start: 2017-02-25 | End: 2017-02-28 | Stop reason: HOSPADM

## 2017-02-25 RX ORDER — LIDOCAINE 40 MG/G
CREAM TOPICAL
Status: DISCONTINUED | OUTPATIENT
Start: 2017-02-25 | End: 2017-02-28 | Stop reason: HOSPADM

## 2017-02-25 RX ORDER — HYDROCORTISONE 2.5 %
CREAM (GRAM) TOPICAL 3 TIMES DAILY PRN
Status: DISCONTINUED | OUTPATIENT
Start: 2017-02-25 | End: 2017-02-28 | Stop reason: HOSPADM

## 2017-02-25 RX ORDER — EPHEDRINE SULFATE 50 MG/ML
INJECTION, SOLUTION INTRAMUSCULAR; INTRAVENOUS; SUBCUTANEOUS
Status: DISCONTINUED
Start: 2017-02-25 | End: 2017-02-25 | Stop reason: HOSPADM

## 2017-02-25 RX ORDER — ACETAMINOPHEN 325 MG/1
975 TABLET ORAL EVERY 8 HOURS
Status: DISCONTINUED | OUTPATIENT
Start: 2017-02-25 | End: 2017-02-28 | Stop reason: HOSPADM

## 2017-02-25 RX ORDER — OXYTOCIN 10 [USP'U]/ML
INJECTION, SOLUTION INTRAMUSCULAR; INTRAVENOUS
Status: DISCONTINUED
Start: 2017-02-25 | End: 2017-02-25 | Stop reason: WASHOUT

## 2017-02-25 RX ADMIN — ONDANSETRON 4 MG: 2 INJECTION INTRAMUSCULAR; INTRAVENOUS at 19:01

## 2017-02-25 RX ADMIN — PHENYLEPHRINE HYDROCHLORIDE 100 MCG: 10 INJECTION, SOLUTION INTRAMUSCULAR; INTRAVENOUS; SUBCUTANEOUS at 19:00

## 2017-02-25 RX ADMIN — OXYTOCIN-SODIUM CHLORIDE 0.9% IV SOLN 30 UNIT/500ML 18 UNITS/HR: 30-0.9/5 SOLUTION at 18:56

## 2017-02-25 RX ADMIN — SODIUM CHLORIDE, POTASSIUM CHLORIDE, SODIUM LACTATE AND CALCIUM CHLORIDE 500 ML: 600; 310; 30; 20 INJECTION, SOLUTION INTRAVENOUS at 15:58

## 2017-02-25 RX ADMIN — PHENYLEPHRINE HYDROCHLORIDE 100 MCG: 10 INJECTION, SOLUTION INTRAMUSCULAR; INTRAVENOUS; SUBCUTANEOUS at 19:16

## 2017-02-25 RX ADMIN — Medication 4 ML: at 00:37

## 2017-02-25 RX ADMIN — ACETAMINOPHEN 975 MG: 325 TABLET, FILM COATED ORAL at 22:34

## 2017-02-25 RX ADMIN — CEFAZOLIN SODIUM 2 G: 2 INJECTION, SOLUTION INTRAVENOUS at 18:39

## 2017-02-25 RX ADMIN — Medication 4 ML: at 00:35

## 2017-02-25 RX ADMIN — BUPIVACAINE HYDROCHLORIDE AND EPINEPHRINE BITARTRATE 20 ML: 2.5; .005 INJECTION, SOLUTION INFILTRATION; PERINEURAL at 20:41

## 2017-02-25 RX ADMIN — LIDOCAINE HYDROCHLORIDE,EPINEPHRINE BITARTRATE 1 ML: 15; .005 INJECTION, SOLUTION EPIDURAL; INFILTRATION; INTRACAUDAL; PERINEURAL at 00:35

## 2017-02-25 RX ADMIN — Medication 2.5 MILLION UNITS: at 11:23

## 2017-02-25 RX ADMIN — Medication 0.2 MCG/KG/MIN: at 18:29

## 2017-02-25 RX ADMIN — SODIUM CHLORIDE, POTASSIUM CHLORIDE, SODIUM LACTATE AND CALCIUM CHLORIDE 500 ML: 600; 310; 30; 20 INJECTION, SOLUTION INTRAVENOUS at 08:11

## 2017-02-25 RX ADMIN — Medication 2.5 MILLION UNITS: at 07:24

## 2017-02-25 RX ADMIN — SODIUM CHLORIDE, POTASSIUM CHLORIDE, SODIUM LACTATE AND CALCIUM CHLORIDE: 600; 310; 30; 20 INJECTION, SOLUTION INTRAVENOUS at 19:22

## 2017-02-25 RX ADMIN — BUPIVACAINE HYDROCHLORIDE IN DEXTROSE 1.6 ML: 7.5 INJECTION, SOLUTION SUBARACHNOID at 18:39

## 2017-02-25 RX ADMIN — SODIUM CHLORIDE, POTASSIUM CHLORIDE, SODIUM LACTATE AND CALCIUM CHLORIDE 1000 ML: 600; 310; 30; 20 INJECTION, SOLUTION INTRAVENOUS at 00:06

## 2017-02-25 RX ADMIN — OXYTOCIN-SODIUM CHLORIDE 0.9% IV SOLN 30 UNIT/500ML 100 ML/HR: 30-0.9/5 SOLUTION at 20:03

## 2017-02-25 RX ADMIN — MORPHINE SULFATE 0.2 MG: 1 INJECTION EPIDURAL; INTRATHECAL; INTRAVENOUS at 18:38

## 2017-02-25 RX ADMIN — SODIUM CHLORIDE, POTASSIUM CHLORIDE, SODIUM LACTATE AND CALCIUM CHLORIDE: 600; 310; 30; 20 INJECTION, SOLUTION INTRAVENOUS at 13:00

## 2017-02-25 RX ADMIN — PHENYLEPHRINE HYDROCHLORIDE 100 MCG: 10 INJECTION, SOLUTION INTRAMUSCULAR; INTRAVENOUS; SUBCUTANEOUS at 19:11

## 2017-02-25 RX ADMIN — KETOROLAC TROMETHAMINE 30 MG: 30 INJECTION, SOLUTION INTRAMUSCULAR at 20:29

## 2017-02-25 RX ADMIN — SODIUM CHLORIDE, POTASSIUM CHLORIDE, SODIUM LACTATE AND CALCIUM CHLORIDE: 600; 310; 30; 20 INJECTION, SOLUTION INTRAVENOUS at 06:44

## 2017-02-25 RX ADMIN — PHENYLEPHRINE HYDROCHLORIDE 100 MCG: 10 INJECTION, SOLUTION INTRAMUSCULAR; INTRAVENOUS; SUBCUTANEOUS at 19:08

## 2017-02-25 RX ADMIN — BUPIVACAINE 20 ML: 13.3 INJECTION, SUSPENSION, LIPOSOMAL INFILTRATION at 20:41

## 2017-02-25 RX ADMIN — Medication 2.5 MILLION UNITS: at 03:46

## 2017-02-25 RX ADMIN — SODIUM CITRATE AND CITRIC ACID MONOHYDRATE 30 ML: 500; 334 SOLUTION ORAL at 18:14

## 2017-02-25 RX ADMIN — OXYTOCIN-SODIUM CHLORIDE 0.9% IV SOLN 30 UNIT/500ML 2 MILLI-UNITS/MIN: 30-0.9/5 SOLUTION at 11:32

## 2017-02-25 RX ADMIN — SODIUM CHLORIDE, POTASSIUM CHLORIDE, SODIUM LACTATE AND CALCIUM CHLORIDE: 600; 310; 30; 20 INJECTION, SOLUTION INTRAVENOUS at 00:45

## 2017-02-25 RX ADMIN — OXYTOCIN-SODIUM CHLORIDE 0.9% IV SOLN 30 UNIT/500ML 8 MILLI-UNITS/MIN: 30-0.9/5 SOLUTION at 16:34

## 2017-02-25 RX ADMIN — Medication 10 ML/HR: at 00:35

## 2017-02-25 RX ADMIN — LIDOCAINE HYDROCHLORIDE,EPINEPHRINE BITARTRATE 3 ML: 15; .005 INJECTION, SOLUTION EPIDURAL; INFILTRATION; INTRACAUDAL; PERINEURAL at 00:32

## 2017-02-25 RX ADMIN — ONDANSETRON 4 MG: 2 INJECTION INTRAMUSCULAR; INTRAVENOUS at 14:25

## 2017-02-25 RX ADMIN — SODIUM CHLORIDE, POTASSIUM CHLORIDE, SODIUM LACTATE AND CALCIUM CHLORIDE: 600; 310; 30; 20 INJECTION, SOLUTION INTRAVENOUS at 18:24

## 2017-02-25 RX ADMIN — OXYTOCIN-SODIUM CHLORIDE 0.9% IV SOLN 30 UNIT/500ML 4 MILLI-UNITS/MIN: 30-0.9/5 SOLUTION at 12:02

## 2017-02-25 RX ADMIN — Medication 2.5 MILLION UNITS: at 15:59

## 2017-02-25 RX ADMIN — LIDOCAINE HYDROCHLORIDE,EPINEPHRINE BITARTRATE 1 ML: 15; .005 INJECTION, SOLUTION EPIDURAL; INFILTRATION; INTRACAUDAL; PERINEURAL at 00:37

## 2017-02-25 RX ADMIN — OXYTOCIN-SODIUM CHLORIDE 0.9% IV SOLN 30 UNIT/500ML 6 MILLI-UNITS/MIN: 30-0.9/5 SOLUTION at 12:39

## 2017-02-25 NOTE — PROVIDER NOTIFICATION
02/25/17 1335   Provider Notification   Provider Name/Title Shayna Samuels SALTY   Method of Notification At Bedside   Request Evaluate in Person   Notification Reason Status Update;SVE         SVE performed. See flowsheet. Patient comfortable with Epidural. Denies pain. VSS. /82  Temp 98.3  F (36.8  C) (Oral)  Resp 16  LMP 05/15/2016  SpO2 96%. FHR WDL. No decels observed. Ctx pattern becoming more regular. IV Oxytocin utilized. Currently at 6 units. Strip and FHR reviewed with CNM. No concerns noted. Will continue to support labor needs at this time.

## 2017-02-25 NOTE — H&P
"  Fifi Hook is a 31 year old female    Estimated Date of Delivery: Feb 19, 2017 is calculated from  Patient's last menstrual period was 05/15/2016. [facility] at 11:14 PM  in in active labor labor.   Membranes are intact    Labor start time 1800.    PRENATAL COURSE  Prenatal care began at 9 wks gestation for a total of 11 prenatal  visits.  Total wt gain 30 lbs  Prenatal vital signs WNL  Medications in pregnancy   Current Facility-Administered Medications:      lactated ringers infusion, , Intravenous, Continuous, Isamar Aldridge APRN CNM     lactated ringers BOLUS 500 mL, 500 mL, Intravenous, Once PRN, Isamar Aldridge APRN CNM     lactated ringers BOLUS 1,000 mL, 1,000 mL, Intravenous, Once PRN **OR** lactated ringers BOLUS 500 mL, 500 mL, Intravenous, Once PRN, Isamar Aldridge APRN CNM     acetaminophen (TYLENOL) tablet 650 mg, 650 mg, Oral, Q4H PRN, Isamar Aldridge APRN CNM     naloxone (NARCAN) injection 0.1-0.4 mg, 0.1-0.4 mg, Intravenous, Q2 Min PRN, Isamar Aldridge APRN CNM     ondansetron (ZOFRAN) injection 4 mg, 4 mg, Intravenous, Q6H PRN, Isamar Aldridge APRN CNM     oxytocin (PITOCIN) injection 10 Units, 10 Units, Intramuscular, Once PRN, Isamar Aldridge APRN CNM     oxytocin (PITOCIN) 30 units in 500 mL 0.9% NaCl infusion, 100-340 mL/hr, Intravenous, Continuous PRN, Isamar Aldridge APRN CNM     Medication Instructions: misoprostol (CYTOTEC)- Nurse to discuss ordering with provider, if needed. Ordered via \"OB misoprostol (CYTOTEC) Postpartum Hemorrhage PANEL\", , Does not apply, Continuous PRN, Isamar Aldridge APRN CNM     methylergonovine (METHERGINE) injection 200 mcg, 200 mcg, Intramuscular, Once PRN, Isamar Aldridge APRN CNM     carboprost (HEMABATE) injection 250 mcg, 250 mcg, Intramuscular, Once PRN, Isamar Aldridge, APRN CNM     lidocaine 1 % 0.1-20 mL, 0.1-20 mL, Subcutaneous, Once PRN, Isamar Aldridge " AKIL BROWN CNM     ibuprofen (ADVIL/MOTRIN) tablet 800 mg, 800 mg, Oral, Once PRN, Isamar Aldridge APRN CNM     oxyCODONE-acetaminophen (PERCOCET) 5-325 MG per tablet 1 tablet, 1 tablet, Oral, Once PRN, Isamar Aldridge APRN CNM     nitrous oxide/oxygen 50/50 blend, , Inhalation, Continuous PRN, Isamar Aldridge APRN CNM     penicillin G potassium injection 5 Million Units, 5 Million Units, Intravenous, Once **FOLLOWED BY** [START ON 2017] penicillin G potassium injection 2.5 Million Units, 2.5 Million Units, Intravenous, Q4H, Isamar Aldridge APRN CNM  Prenatal course was   complicated by    Patient Active Problem List    Diagnosis Date Noted     Threatened labor at term 2017     Priority: Medium     Normal labor and delivery 2017     Priority: Medium     Supervision of normal first pregnancy 2017     Priority: Medium     FOB: Bello          Need for Tdap vaccination 2017     Priority: Medium     GBS (group B Streptococcus carrier), +RV culture, currently pregnant 2017     Priority: Medium     Positive on 36 week culture       Low back pain during pregnancy, second trimester 2016     Priority: Medium       HISTORIES  No Known Allergies  Past Medical History   Diagnosis Date     Patient denies medical problems      Past Surgical History   Procedure Laterality Date     C oral surgery procedure       Extraction(s) dental       Family History   Problem Relation Age of Onset     Depression Mother      Depression Maternal Grandmother      Asthma Brother      DIABETES Maternal Uncle      Breast Cancer No family hx of      Colon Cancer No family hx of      Social History   Substance Use Topics     Smoking status: Never Smoker     Smokeless tobacco: Never Used     Alcohol use No     Obstetric History       T0      TAB0   SAB0   E0   M0   L0       # Outcome Date GA Lbr Quinton/2nd Weight Sex Delivery Anes PTL Lv   1 Current                   LABS:    Blood  Type A positive   Rhogam n/a   Rubella immune  RPR non reactive   HIV non reactive   HEP negative    Lab Results   Component Value Date    HGB 12.4 01/20/2017      Lab Results   Component Value Date    PAP wnl and hpv negative 06/24/2016     GBS positive     ROS  C: NEGATIVE for fever, chills, change in weight  I: NEGATIVE for worrisome rashes, moles or lesions  E: NEGATIVE for vision changes or irritation  E/M: NEGATIVE for ear, mouth and throat problems  R: NEGATIVE for significant cough or SOB  B: NEGATIVE for masses, tenderness or discharge  CV: NEGATIVE for chest pain, palpitations or peripheral edema  GI: NEGATIVE for nausea, abdominal pain, heartburn, or change in bowel habits  : NEGATIVE for frequency, dysuria, or hematuria  E: NEGATIVE for temperature intolerance, skin/hair changes    PHYSICAL EXAM:  Vital signs stable, afebrile and BP is /79  Temp 98.5  F (36.9  C) (Oral)  Resp 18  LMP 05/15/2016    General appearance healthy, alert, active and moderate distress  Heart: RRR without murmur  Lungs:  clear to auscultationr   Neuro:  denies headache and visual disturbances  Psych: Mentation normal and bright   Legs: 2+/2+, no clonus, no edema       Abdomen: gravid, single vertex fetus, non-tender, EFW 7 1/2 lbs. Pt  is sherin every 2-4 minutes, lasting 80 seconds and palpates moderate    FETAL HEART TONES: baseline 150 with moderate FHRV variability and  accelerations.  No decelerations present.     PELVIC EXAM: 5/ 80/ Mid/ soft/ -2  LOTT SCORE:    BLOODY SHOW:: no  Membranes as listed above    ASSESSMENT:  IUP @ 40w5d in in active labor  NST  reactive  CST not done  Fetal-maternal status reassuring      PLAN:  Admit - see IP orders, patient declined RPR,  Pain medication pt requesting epidural. Pt has been up for 2 nights, as discharged this morning after being up all night, slept some at home during daytime but has contracted most of the day.    Will give epidural and antibiotic, and  AROM to facilitate labor.  Prophylactic antibiotic for + GBS status  Anticipate   Isamar Aldridge CNM

## 2017-02-25 NOTE — PROGRESS NOTES
Reassessment of cervix one hour after last exam - no cervical change, continued swelling. Both patient and  ready to proceed with  birth at this time. Talked them through what to expect and Dr. Seals notified. Will prepare for CS at this time. Shayna Samuels CNM

## 2017-02-25 NOTE — PLAN OF CARE
Data: Patient presented to [facility] at 2139.   Reason for maternal/fetal assessment per patient is Rule Out Labor  .  Patient is a . Prenatal record reviewed.      Obstetric History       T0      TAB0   SAB0   E0   M0   L0       # Outcome Date GA Lbr Quinton/2nd Weight Sex Delivery Anes PTL Lv   1 Current               . Medical history:   Past Medical History   Diagnosis Date     Patient denies medical problems    . Gestational Age 40w6d. VSS. Fetal movement present. Patient denies vaginal discharge, pelvic pressure, UTI symptoms, GI problems, bloody show, vaginal bleeding, edema, headache, visual disturbances, epigastric or URQ pain, rupture of membranes. Support person present. Admits uterine pain with ctx coming every 2-3 minutes and lasting 60-90 seconds.  Action: Verbal consent for EFM. Triage assessment completed. Fetal assessment: Presumed adequate fetal oxygenation documented (see flow record).   Response: MAHENDRA Aldridge CNM informed of patient status and arrival. Plan per provider is admit to L & D and plan is to progress toward . Patient verbalized agreement with plan. Patient transferred to room 479. Care transferred to LIZETTE Mcgrath.

## 2017-02-25 NOTE — PLAN OF CARE
Problem: Labor (Cervical Ripen, Induct, Augment) (Adult,Obstetrics,Pediatric)  Goal: Signs and Symptoms of Listed Potential Problems Will be Absent or Manageable (Labor)  Signs and symptoms of listed potential problems will be absent or manageable by discharge/transition of care (reference Labor (Cervical Ripen, Induct, Augment) (Adult,Obstetrics,Pediatric) CPG).   Patient laboring and walking upon arrival to shift. Reports of pain and pressure with contractions. Initiated nitrous oxide and decreased pain. Requested epidural and tolerated. Denying pain after epidural placement. Patient able to rest comfortably throughout shift. Afebrile and VSS. Straight cath x2 for clear, yellow urine. Brigid every 2-4. EFM tracing mod variability, accels present and no decels.

## 2017-02-25 NOTE — PROGRESS NOTES
Pt with minimal cervical change for 20 hours now and fetal baseline now 160's with minimal variability.  Has been adequate with IUPC and pitocin.  Pt and spouse agree with c/s now.    [facility]  DYSTOCIA ARREST HUDDLE       Choose one diagnosis below  Diagnosis of Dystocia/ Arrest:  Cervix is >=6cm  Membranes are ruptured (if possible), then No cervical change after at least 4 hours of adequate uterine activity (strong by palpation or MVUs greater than 200), OR at least 6 hours of oxytocin administration with inadequate uterine activity.     c/s was discussed in detail including risk of bleeding, infection, damage to abdominal organs including bowel, bladder, blood vessels, nerves, and baby.   Recovery period/hosptial stay and restrictions discussed.  Pain medications after surgery were discussed.  All questions were answered.    Agnes Seals MD

## 2017-02-25 NOTE — PROVIDER NOTIFICATION
02/25/17 1514   Provider Notification   Provider Name/Title Shayna Samuels CNJESSEE   Method of Notification Phone   Request Evaluate - Remote   Notification Reason Status Update     Discussed pt status with CNM. Discussed FHR (Tachycardia). Requested IVFB. Verbal order given. Placed pt on Oxygen per provider.

## 2017-02-25 NOTE — PROGRESS NOTES
Assessed pt for possible manual rotation as not much cervical change over last 12 hours    Baby EFW 7 lb 10 oz and direct OP, 6/70/-2 station  Has dense epidural    Able to rotate vertex to JAYDEN with ease and lot of fluid came out in the process, back now on maternal left side and better position in pelvis.  IUPC placed and having contractions, but MVU only 20-30 with each.  Discussed pitocin augmentation as well to see if can help make contractions stronger.  Will make sure has current type and screen.    Agnes Seals MD

## 2017-02-25 NOTE — PROVIDER NOTIFICATION
02/25/17 1044   Labor Care   Labor Interventions Fetal monitoring/strip review every 15 mins     IUPC placed to determine strength of ctx.

## 2017-02-25 NOTE — PROGRESS NOTES
S:Patient requesting cervical exam and RN paged to review FHT's strip. Baseline trending up - 160-165 now.     O:  Blood pressure 121/83, temperature 97.7  F (36.5  C), temperature source Oral, resp. rate 16, last menstrual period 05/15/2016, SpO2 94 %, unknown if currently breastfeeding.  General appearance: comfortable  CONTRACTIONS: Contractions every 1.5-3 minutes.  Palpate: IUPC - MVUs adequate for the last hour  FETAL HEART TONES: baseline 165 with moderate FHR variability and    accelerations no. Decelerations no.    ROM: clear fluid  PELVIC EXAM:7.5/80/-1  Fetal Position:  vertex  Bloody show: Yes   Pitocin- 6 mu/min.,  Antibiotics- PCN    ASSESSMENT:  ==============  IUP @ 40w6d active labor - good change since last exam  Fetal Heart rate tracing Category one  GBS- positive  Pitocin augmentation per protocol     PLAN:  ===========  Continue with position changes  Titrate pitocin per protocol  Reassess in 2-4 hours or sooner prn   Shayna Samuels CNM

## 2017-02-25 NOTE — ANESTHESIA PREPROCEDURE EVALUATION
Anesthesia Evaluation       history and physical reviewed .      No history of anesthetic complications     ROS/MED HX    ENT/Pulmonary:  - neg pulmonary ROS     Neurologic:  - neg neurologic ROS     Cardiovascular:  - neg cardiovascular ROS       METS/Exercise Tolerance:     Hematologic:         Musculoskeletal:         GI/Hepatic:  - neg GI/hepatic ROS       Renal/Genitourinary:         Endo:         Psychiatric:         Infectious Disease:         Malignancy:         Other:               Physical Exam  Normal systems: cardiovascular, pulmonary and dental    Airway   Mallampati: II  TM distance: > 3 FB  Neck ROM: full  Mouth opening: > 3 cm    Dental     Cardiovascular       Pulmonary     Other findings: Fifi Hook is a 31 year old female   Estimated Date of Delivery: 2017 is calculated from  Patient's last menstrual period was 05/15/2016. [facility] at 11:14 PM in in active labor labor.   Membranes are intact         neg OB ROS          HPI: Fifi Hook is a  31 year old female with no significant PMH now with an IUP at 40w6d complicated by Dystocia/ Arrest. Pt with epidural, much stronger block on R vs L. Plan for spinal.    History and physical reviewed; no interval change.    Procedure: Procedure(s):   - Wound Class: I-Clean       PMHx/PSHx/ROS:  Past Medical History   Diagnosis Date     Patient denies medical problems        Past Surgical History   Procedure Laterality Date     C oral surgery procedure       Extraction(s) dental              Anesthesia Plan      History & Physical Review  History and physical reviewed and following examination; no interval change.    ASA Status:  2 emergent.  OB Epidural Asa: 2       Plan for Spinal and Periph. Nerve Block for postop pain          Postoperative Care  Postoperative pain management:  IV analgesics, Peripheral nerve block (Single Shot) and Oral pain medications.      Consents  Anesthetic plan, risks,  benefits and alternatives discussed with:  Patient..

## 2017-02-25 NOTE — ANESTHESIA PROCEDURE NOTES
Epidural Procedure Note    Staff:     Anesthesiologist:  RENA LANGE    Resident/CRNA:  IBIS STANLEY    Procedure performed by resident/CRNA in the presence of a teaching physician    Location: OB   Pre-procedure checklist:   patient identified, IV checked, risks and benefits discussed, informed consent, monitors and equipment checked, pre-op evaluation and at physician/surgeon's request      Correct Patient: Yes      Correct Position: Yes      Correct Site: Yes      Correct Procedure: Yes      Correct Laterality:  N/A  Procedure:     Procedure:  Epidural catheter    Position:  Sitting    Sterile Prep: chloraprep      Insertion site:  L3-4    Local skin infiltration:  1% lidocaine    amount (mL):  3    Approach:  Midline    Needle gauge (G):  17    Needle Length (in):  3.5    Block Needle Type:  Touhy    Injection Technique:  LORT saline    JANET at (cm):  6    Attempts:  1    Redirects:  0    Catheter gauge (G):  19    Catheter threaded easily: Yes      Threaded to cm at skin:  10    Threaded in epidural space (cm):  4    Paresthesias:  No    Aspiration negative for Heme or CSF: Yes      Test dose (mL):  3     Local anesthetic:  Lidocaine 1.5% w/ 1:200,000 epinephrine    Test dose time:  00:32    Test dose negative for signs of intravascular, subdural or intrathecal injection: Yes

## 2017-02-25 NOTE — PROGRESS NOTES
S:After cervical exam revealed no cervical change we had a discussion about options. Initially, I recommended pitocin. Pt asking about risk of pitocin vs  and also wondering if baby could be manually rotated to a more optimal position. I explained that I was having a hard time determining exact position but that I would consult Dr. Seals for her to come assess.     Dr. Seals to room - was able to manually rotate baby to a JAYDEN position. IUPC placed at this time and the need for pitocin was clearly identified despite regular contraction pattern. MVUs 125 in a 10-minute period, 4 contractions total.     O:  Blood pressure 129/87, temperature 98.4  F (36.9  C), temperature source Oral, resp. rate 16, last menstrual period 05/15/2016, SpO2 95 %, unknown if currently breastfeeding.  General appearance: comfortable    CONTRACTIONS: Contractions every 2-3 minutes. MVUs 125 in a 10-minute period, 4 contractions total.     Palpate: moderate  FETAL HEART TONES: baseline 150 with moderate FHR variability and    accelerations yes. Decelerations no.    NST: REACTIVE  ROM: clear fluid  PELVIC EXAM:6/80/-1  Fetal Position:  Vertex, was direct OP and asynclitic and was rotated to JAYDEN  Bloody show: Yes   Pitocin- ordered,  Antibiotics- PCN    ASSESSMENT:  ==============  IUP @ 40w6d minimal/no progress   Fetal Heart rate tracing Category one  GBS- positive  Pitocin augmentation     PLAN:  ===========  Pitocin ordered and to be titrated based on MVUs prn - would like MVUs to be between 180-200 for at least two hours before rechecking cervix   Will change ABO/Rh to a T&S  Dr Seals available prn  SALTY Guillen CNM

## 2017-02-25 NOTE — PROVIDER NOTIFICATION
17 2206   Provider Notification   Provider Name/Title SALTY Aldridge   Method of Notification Electronic Page   Request Evaluate in Person   Notification Reason Patient Arrived;Status Update   Pt arrived r/o labor.  at 40w5d. Mod ctxs q 2-4 mins. FHR reactive. Intact. Denies VB or LOF. Please place orders and evaluate.

## 2017-02-25 NOTE — PROGRESS NOTES
S:Discussion with patient and  after cervical exam revealed no cervical change and swelling cervical tissue. Reviewed all options available at this time. Patient upset and teary.     O:  Blood pressure (!) 149/96, temperature 99.2  F (37.3  C), temperature source Oral, resp. rate 16, last menstrual period 05/15/2016, SpO2 94 %, unknown if currently breastfeeding.  General appearance: comfortable  CONTRACTIONS: Contractions every 1.5-3 minutes.  Palpate: moderate  FETAL HEART TONES: baseline 160 with minimal FHR variability and    accelerations no. Decelerations no.    NST: NON-REACTIVE  ROM: clear fluid  PELVIC EXAM:7.5/80/0, swelling cervical tissue  Fetal Position:  vertex  Bloody show: No  Pitocin- 8 mu/min.,  Antibiotics- PCN    ASSESSMENT:  ==============  IUP @ 40w6d minimal/no progress   Fetal Heart rate tracing Category two  GBS- positive  Pitocin augmentation     PLAN:  ===========  Long discussion about concerns re: swelling cervix, no cervical change despite adequate contractions for three hours now. Patient and  would like one more hour to wait and see if any further cervical change.   Dr Seals aware and in house  Labs are drawn  Shayna Samuels CNM

## 2017-02-25 NOTE — PROVIDER NOTIFICATION
02/25/17 1130   FHR   Monitor External US   Variability < or equal to 5 BPM   Baseline Rate (Fetus A) 150 bpm   Baseline Classification Normal   Accelerations Not present   Decelerations LD;Intermittent     Pt repositioned frequently.

## 2017-02-25 NOTE — PROGRESS NOTES
S:Patient has been comfortable with epidural, slept well. No reported change in pressure sensation. Patient has not had cervical exam since 2230 last night, epidural placed around 0030.     O:  Blood pressure 112/67, temperature 98.6  F (37  C), temperature source Oral, resp. rate 16, last menstrual period 05/15/2016, SpO2 95 %, unknown if currently breastfeeding.  General appearance: comfortable    CONTRACTIONS: Contractions every 2-4 minutes.  Palpate: moderate  FETAL HEART TONES: baseline 150 with minimal variability with periods of moderate FHR variability and    accelerations yes. Decelerations no.    ROM: clear fluid, AROM of forebag for moderate amount of clear fluid  PELVIC EXAM:6/90/-1  Fetal Position:  vertex  Bloody show: No  Pitocin- none,  Antibiotics- PCN, adequately treated    ASSESSMENT:  ==============  IUP @ 40w6d minimal/no progress   Fetal Heart rate tracing Category one  GBS- positive     PLAN:  ===========  Discussed with patient and  that there has been minimal, if any, progress in almost 10 hours. Reviewed options for augmentation - AROM of forebag done. Contractions too frequent at this time to augment with pitocin. Will continue position changes with peanut ball and sitting up in bed.    Plan to reassess in 2-3 hours or sooner milka Samuels CNM

## 2017-02-25 NOTE — PROVIDER NOTIFICATION
02/25/17 0815   Cervical Exam   Dilation 6   Effacement (%) 90   Fetal Presentation Cephalic   Membranes   Membrane Status Ruptured    Rupture Type AROM     SVE performed by Shayna Samuels CNM. Bag of fluid was felt during exam. AROM performed. Moderate amount of clear fluid observed. Pt repositioned. IVF bolus

## 2017-02-25 NOTE — PROVIDER NOTIFICATION
02/25/17 0046   Provider Notification   Provider Name/Title Oly   Method of Notification At Bedside   Request Evaluate in Person   Notification Reason Pain   Provider at bedside to reevaluate patient pain level after epidural. Patient is able to rest comfortably and denies pain.

## 2017-02-25 NOTE — PLAN OF CARE
Problem: Goal Outcome Summary  Goal: Goal Outcome Summary  Outcome: No Change  Pt continues to ctx q 1-3 minutes. FHR WDL at this time of observation. FHR has been tachycardic. CNM aware. IVF have been attempted. Repositioning and oxygen have been attempted as well. Pt VS have been stable up until 1600. BP has been elevated. CNM aware. Last checked proved unchanged. CNM had discussion with pt. Oxytocin running per CNM. Dr. Seals aware of situation. Pt and  wanted time to process situation. Emotional support offered and provided. Pt did not verbalize any other questions at this time. Will continue to support labor and emotional needs at this time.

## 2017-02-25 NOTE — PLAN OF CARE
Problem: Goal Outcome Summary  Goal: Goal Outcome Summary  Outcome: No Change  Pt VSS. /86  Temp 98.4  F (36.9  C) (Oral)  Resp 16  LMP 05/15/2016  SpO2 96%. Pt continues to ctx q 2-4 minutes at last observation. FHR WDL. Baseline 145. Pt has been repositioning frequently depending on FHR. Straight cathed for 700ml of clear, yellow urine. Pt comfortable with epidural. Support person at bedside. Repeat SVE per CNM to be at approximately 1000. Will continue to support labor needs at this time.

## 2017-02-26 LAB — HGB BLD-MCNC: 9 G/DL (ref 11.7–15.7)

## 2017-02-26 PROCEDURE — 25000128 H RX IP 250 OP 636: Performed by: OBSTETRICS & GYNECOLOGY

## 2017-02-26 PROCEDURE — 25800025 ZZH RX 258: Performed by: OBSTETRICS & GYNECOLOGY

## 2017-02-26 PROCEDURE — 85018 HEMOGLOBIN: CPT | Performed by: OBSTETRICS & GYNECOLOGY

## 2017-02-26 PROCEDURE — 12000030 ZZH R&B OB INTERMEDIATE UMMC

## 2017-02-26 PROCEDURE — 36415 COLL VENOUS BLD VENIPUNCTURE: CPT | Performed by: OBSTETRICS & GYNECOLOGY

## 2017-02-26 PROCEDURE — 25000132 ZZH RX MED GY IP 250 OP 250 PS 637: Performed by: OBSTETRICS & GYNECOLOGY

## 2017-02-26 RX ADMIN — ACETAMINOPHEN 975 MG: 325 TABLET, FILM COATED ORAL at 14:10

## 2017-02-26 RX ADMIN — SODIUM CHLORIDE, SODIUM LACTATE, POTASSIUM CHLORIDE, CALCIUM CHLORIDE AND DEXTROSE MONOHYDRATE: 5; 600; 310; 30; 20 INJECTION, SOLUTION INTRAVENOUS at 00:47

## 2017-02-26 RX ADMIN — KETOROLAC TROMETHAMINE 30 MG: 30 INJECTION, SOLUTION INTRAMUSCULAR at 08:33

## 2017-02-26 RX ADMIN — IBUPROFEN 800 MG: 400 TABLET ORAL at 20:15

## 2017-02-26 RX ADMIN — ACETAMINOPHEN 975 MG: 325 TABLET, FILM COATED ORAL at 22:40

## 2017-02-26 RX ADMIN — KETOROLAC TROMETHAMINE 30 MG: 30 INJECTION, SOLUTION INTRAMUSCULAR at 02:26

## 2017-02-26 RX ADMIN — SENNOSIDES AND DOCUSATE SODIUM 1 TABLET: 8.6; 5 TABLET ORAL at 08:33

## 2017-02-26 RX ADMIN — KETOROLAC TROMETHAMINE 30 MG: 30 INJECTION, SOLUTION INTRAMUSCULAR at 14:10

## 2017-02-26 RX ADMIN — SENNOSIDES AND DOCUSATE SODIUM 2 TABLET: 8.6; 5 TABLET ORAL at 20:15

## 2017-02-26 RX ADMIN — ACETAMINOPHEN 975 MG: 325 TABLET, FILM COATED ORAL at 06:09

## 2017-02-26 NOTE — OP NOTE
[facility]  Obstetrics Service Operative Report    Surgery Date:  2017  Surgeon(s): Dr. Seals  Assistants:  Macrina Cartagena, OB/GYN Resident, PGY-3, Brayan Marsh MS4    Preoperative Diagnoses:  1.  Intrauterine pregnancy at 40w6d  2.  Arrest of dilation   3.  Cat II FHT remote from delivery     Postoperative diagnoses: Same, delivered    Procedure performed:  Primary low transverse  section via pfannenstiel skin incision with double layer uterine closure, placement of left O'[place]apa suture     Anesthesia:  Spinal   Est Blood Loss (mL): 1500 mL  Fluid replacement: 1700cc crystalloid   Specimens: None  Complications: None apparent     Operative findings: No intraabdominal or abdominal wall adhesions. Clear fluid with amniotomy. Vigorous male infant born vertex, ROP. APGARs 8 and 9. Weight 8lbs 9oz. Uterine atony noted during hysterotomy closure. Left O'[place]apa placement with improvement in hemostasis. Normal appearing uterus, ovaries and fallopian tubes.       Indication: Fifi Hook is a 31 year old  who was admitted to the CNM service at 40w6d by LMP c/w 12w0d ultrasound in spontaneous labor. She was noted to progress to 7cm dilation, but made no cervical change despite adequate labor for >4hrs with adequate MVUs on IUPC. She was also noted to have a Cat II FHT due to loss of variability on fetal monitoring. Given these findings, she was recommended to proceed with  delivery The risks, benefits, and alternatives of  delivery were explained and the patient agreed to proceed. Informed consent was signed.     Procedure details:  The patient was taken to the operating room where she received 2 grams of ancef prior to the skin incision. She was placed in the dorsal supine position with a leftward tilt and prepped and draped in the usual sterile fashion. Following test of adequate spinal anesthesia, the abdomen was entered through a transverse skin  incision. The skin incision was made sharply and carried through the subcutaneous tissue to the fascia.  Fascia was incised in the midline and extended laterally with the Garcia scissors. The superior margin of the fascial incision was grasped with Kocher clamps and dissected from the underlying muscle with blunt dissecton, which was then repeated at the lower margin of the fascial incision.  The muscle was  in the midline. The peritoneum was entered bluntly and the opening extended laterally with care to avoid the bladder. A bladder blade was placed. The lower segment of the uterus was opened sharply in a transverse fashion and extended with digital pressure. The infant's head was elevated to the level of the hysterotomy and was delivered atraumatically. The cord was doubly clamped and cut and the infant was handed off to the waiting SCN staff after 1 minute of delayed cord clamping. A segment of the cord was cut and set aside for cord gases if needed. The placenta was delivered via gentle cord traction. The uterus was exteriorized from the abdomen and cleared of all clots and debris. Oxytocin was given through the running IV. The hysterotomy was repaired with 0-monocryl suture in a running locked fashion. A 2nd layer of 0-monocryl was used to imbricate the incision. The patient was noted to have persistent mild uterine atony and bleeding from the left lateral aspect of the hysterotomy. Given this findings, an O'Jamul was placed over the left uterine artery with 0-monocryl. The posterior cul-de-sac was suctioned, irrigated and the uterus was returned to the abdomen. The bilateral pericolic gutters were irrigated and suctioned. The hysterotomy was again inspected and an additional figure-of-x was required at the right lateral aspect of the hysterotomy to achieve hemostasis. The abdominal wall was examined and cautery was used to achieve hemostasis. Sepra-film was placed over the hysterotomy and the rectus.  The fascia was closed with a running suture of 0-vicryl. Subcutaneous tissue was irrigated. Areas that were oozing were controlled with cautery. The skin was closed with 4-0 monocryl and dermabond. The patient tolerated the procedure well and was taken to the recovery room in stable condition. All sponge, needle and instrument counts were correct x2. Dr. Seals was present for the entire procedure.     Macrina Cartagena MD  Ob/Gyn Resident Physician PGY-3  2/25/2017 10:17 PM

## 2017-02-26 NOTE — PLAN OF CARE
Problem: Goal Outcome Summary  Goal: Goal Outcome Summary  Outcome: Improving  Data: Vital signs within normal limits. Postpartum checks within normal limits - see flow record. One assessment of moderate flow, no clots noted, fundus firm/U/midline (see flow record). Patient tolerating fluids and crackers well. Patient is producing adequate urine output, encouraged to drink water overnight. Incision healing well.   Action: Patient medicated during the shift for incisional tenderness. Reporting relief with TAPs block. Patient education done about self care and the importance of ambulation as tolerated during the day, will try and stand at edge of bed during the 6am hour.   Response: Positive attachment behaviors observed with infant. Will, FOB/ present and very supportive.   Will continue to assess and provide encouragement.

## 2017-02-26 NOTE — PLAN OF CARE
Problem: Goal Outcome Summary  Goal: Goal Outcome Summary  Outcome: Therapy, progress toward functional goals as expected  Pt arrived to [facility] at 2200 via cart. Patient, infant and  bands verified with LIZETTE Hopkins. Orientated patient to room and new family folder. VSS, postpartum assessment WNL. Pt denies pain at this time. Pitocin running in RUE at 100 mL/hr. Christy in place with adequate output - plan to remove in AM. Pt tolerating fluids and crackers at this time. Breastfeeding infant with assist obtaining deeper latch. Continue plan of care.

## 2017-02-26 NOTE — PLAN OF CARE
Problem: Goal Outcome Summary  Goal: Goal Outcome Summary  Outcome: Therapy, progress toward functional goals as expected  Stable postpartum and pain managed with medication. Ambulating in room without difficulty. Assisted pt with lactation and pt demonstrated bringing baby to breast and achieved a latch. Parents are bonding with baby.

## 2017-02-26 NOTE — DISCHARGE SUMMARY
[facility] Discharge Summary    Fifi Hook MRN# [id number]   Age: 31 year old YOB: 1985     Date of Admission:  2017  Date of Discharge:  2017  Admitting Physician:  Agnes Seals MD  Discharge Physician:  Dr. Oconnell    Admit Dx:   - Intrauterine pregnancy at 40w6d  - Arrest of dilation   - Cat II FHT remote from delivery     Discharge Dx:  - Same as above, delivered  - Asymptomatic acute surgical blood loss anemia    Procedures:  - Primary low transverse  section via pfannenstiel skin incision with double layer uterine closure, placement of left O'Stanford suture   - Epidural analgesia   - Spinal analgesia  - TAPs blocks    Admit HPI:  Fifi Hook is a 31 year old  who was admitted to the [facility] service at 40w6d by LMP c/w 12w0d ultrasound in spontaneous labor. She was noted to progress to 7cm dilation, but made no cervical change despite adequate labor for >4hrs with adequate MVUs on IUPC. She was also noted to have a Cat II FHT due to loss of variability on fetal monitoring. Given these findings, she was recommended to proceed with  delivery The risks, benefits, and alternatives of  delivery were explained and the patient agreed to proceed. Informed consent was signed.     Please see her admit H&P for full details of her PMH, PSH, Meds, Allergies and exam on admit.    Operative Course:  Surgery was uncomplicated. She was noted to have uterine atony that improved with placement of an O'Stanford suture. Her EBL was 1500cc. Please see her  Section Operative Note for full details regarding her delivery.    Operative Findings: No intraabdominal or abdominal wall adhesions. Clear fluid with amniotomy. Vigorous male infant born vertex, ROP. APGARs 8 and 9. Weight 8lbs 9oz. Uterine atony noted during hysterotomy closure. Left O'Stanford placement with improvement in hemostasis. Normal appearing uterus, ovaries and  fallopian tubes.     Postoperative Course:  Her postoperative course was uncomplicated. On POD#3, she was meeting all of her postpartum goals and deemed stable for discharge. She was voiding without difficulty, tolerating a regular diet without nausea and vomiting, her pain was well controlled on oral pain medicines and her lochia was appropriate. Her hemoglobin prior to delivery was 10.8 and after delivery was 9.0. She was discharged to home with iron. Her Rh status was POS and Rhogam was not indicated.    Discharge Medications:  Current Discharge Medication List      START taking these medications    Details   acetaminophen (TYLENOL) 325 MG tablet Take 2 tablets (650 mg) by mouth every 4 hours as needed for other (surgical pain)  Qty: 50 tablet, Refills: 0    Associated Diagnoses: Encounter for postoperative care      senna-docusate (SENOKOT-S;PERICOLACE) 8.6-50 MG per tablet Take 1-2 tablets by mouth 2 times daily  Qty: 60 tablet, Refills: 0    Associated Diagnoses: Encounter for postoperative care      oxyCODONE (ROXICODONE) 5 MG IR tablet Take 1-2 tablets (5-10 mg) by mouth every 4 hours as needed for moderate to severe pain  Qty: 20 tablet, Refills: 0    Associated Diagnoses: Encounter for postoperative care      ibuprofen (ADVIL/MOTRIN) 600 MG tablet Take 1 tablet (600 mg) by mouth every 6 hours as needed for other (cramping)  Qty: 40 tablet, Refills: 0    Associated Diagnoses: Encounter for postoperative care      ferrous sulfate (IRON) 325 (65 FE) MG tablet Take 1 tablet (325 mg) by mouth daily (with breakfast)  Qty: 60 tablet, Refills: 0    Associated Diagnoses: Encounter for postoperative care         CONTINUE these medications which have NOT CHANGED    Details   Prenatal MV-Min-Fe Fum-FA-DHA (PRENATAL 1 PO) 1 tablet daily         STOP taking these medications       hydrOXYzine (ATARAX) 50 MG tablet Comments:   Reason for Stopping:               Discharge/Disposition:  Fifi Hook was discharged  to home in stable condition with the following instructions/medications:  1) Call for temperature > 100.4, bright red vaginal bleeding >1 pad an hour x 2 hours, foul smelling vaginal discharge, pain not controlled by usual oral pain meds, persistent nausea and vomiting not controlled on medications, drainage or redness from incision site  2) She desired IUD for contraception.  3) For feeding she decided to breastfeed/pump.  4) She was instructed to follow-up with her primary OB in 6 weeks for a routine postpartum visit and   5) Discharge activity:  No heavy lifting >15 lbs or strenuous activity for 6 weeks, pelvic rest for 6 weeks, no driving or operating machinery while on narcotics.    Simran Tineo MD  OB/GYN PGY2

## 2017-02-26 NOTE — ANESTHESIA CARE TRANSFER NOTE
Patient: Fifi Hook    Procedure(s):   - Wound Class: I-Clean    Diagnosis: pregnency  Diagnosis Additional Information: No value filed.    Anesthesia Type:   Spinal, Periph. Nerve Block for postop pain     Note:  Airway :Room Air  Patient transferred to:Labor and Delivery        Vitals: (Last set prior to Anesthesia Care Transfer)    CRNA VITALS  2/25/2017 1917 - 2/25/2017 1947      2/25/2017             Resp Rate (set): 10                Electronically Signed By: AKIL Powers CRNA  February 25, 2017  7:47 PM

## 2017-02-26 NOTE — OR NURSING
AFVSS. Abdomen soft, nontender to palpation. Fundus firm with small flow. Breastfeeding well. TAP block placed per anesthesiologist. Toradol given as ordered.  Denies pain.Tolerating ice chips. Happy. Stable . Will transfer to [facility] via cart. Report given to LIZETTE Cuevas

## 2017-02-26 NOTE — PROGRESS NOTES
OB/GYN Daily Postpartum Note, POD#1    S: Ms. Hook is feeling well this morning. She denies any complaints. Her pain is well-controlled on IV and oral pain medications. She tolerating a regular diet without nausea or vomiting. Did some ambulation and noticed a large gush of blood, but bleeding has slowed down since that time. Christy catheter in place. Breastfeeding without questions or concerns. She plans to use an IUD for contraception.      O:   VS: /71  Pulse 97  Temp 98  F (36.7  C) (Oral)  Resp 16  LMP 05/15/2016  SpO2 96%  Breastfeeding? Unknown  General: resting in bed, in NAD  CV: RRR, no m/r/g, warm and well perfused  Resp: CTAB  Abdomen: soft, appropriately tender, nondistended  Fundus firm 1cm below the umbilicus  Extremities: non-tender, non-edematous       Recent Labs  Lab 02/26/17  0725 02/24/17  2330   HGB 9.0* 10.8*       A: Ms. Hook is a 31 year old now P1, POD #1 s/p PLTCS for arrest of dilation. Delivery complicated by uterine atony.     P:  Continue routine pp cares  Heme 10.8 > EBL 1500 > 9.0, discharge with iron   GI: tolerating regular diet  Feeding: breast  Contraception: IUD  Rh positive, Rubella Immune, GBS POS   Disposition: routine PP cares, anticipate d/c POD#3, once discharge goals are obtained    Macrina Cartagena MD  OBGYN Resident, PGY3    Patient evaluated and examined by me.  Agree with resident note.      Carley Doshi MD

## 2017-02-26 NOTE — ANESTHESIA PROCEDURE NOTES
Spinal/LP Procedure Note    Spinal Block  Staff:     Anesthesiologist:  RASHEED CRISTINA  Location: OB  Procedure Start/Stop Times:     patient identified, IV checked, site marked, risks and benefits discussed, informed consent, monitors and equipment checked and pre-op evaluation      Correct Patient: Yes      Correct Position: Yes      Correct Site: Yes      Correct Procedure: Yes      Correct Laterality:  N/A    Site Marked:  N/A  Procedure:     Procedure:  Intrathecal    Position:  Sitting    Sterile Prep: chloraprep, mask, sterile gloves and patient draped      Insertion site:  L3-4    Approach:  Midline    Needle Type:  Sherrell    Needle gauge (G):  25    Local Skin Infiltration:  1% lidocaine    amount (ml):  3    Needle Length (in):  3.5    Introducer used: Yes      Introducer gauge:  20 G    Attempts:  2    Redirects:  2    CSF:  Clear    Paresthesias:  No  Assessment/Narrative:      Injected 1.6 mL of 0.75% bupivacaine with 0.2 mg duramorph

## 2017-02-26 NOTE — BRIEF OP NOTE
[facility] GYN Brief Operative Note    Patient Name: Fifi Hook  MRN:[id number]  : 1985  Date of Surgery: 17   Pre-operative diagnosis:  1. IUP at 40w6d  2. Arrest of dilation    Post-operative diagnosis:  Same, delivered   Procedure:  Primary low transverse  section via pfannenstiel skin incision with double layer uterine closure, placement of left O'[place] suture    Surgeon:  Dr. Seals (OB/GYN Attending Staff)    Assistant(s):  Macrina Cartagena MD (OB/GYN Resident, PGY3)    Anesthesia:  Spinal    Estimated blood loss:  1500 mL    Total IV fluids:  (See anesthesia record)   1700ml crystaloid    Blood transfusion:  No transfusion was given during surgery    Total urine output:  (See anesthesia record)   600 mL    Drains:  Christy    Specimens:  None    Findings:  No intraabdominal or abdominal wall adhesions. Clear fluid with amniotomy. Vigorous male infant born vertex, ROP. APGARs 8. 2nd APGAR and weight pending. Uterine atony noted during hysterotomy closure. Left O'[place] placement with improvement in hemostasis. Normal appearing uterus, ovaries and fallopian tubes.    Complications:  None    Condition:  Stable, transferred to PACU    Comments:  See accompanying operative report for full details        Macrina Cartagena MD   OB/GYN Resident PGY-3  17

## 2017-02-26 NOTE — PROGRESS NOTES
Pt has made no cervical changes despite adequate ctx pattern. CNM discussed options with patient. Decision to have c/s has been made. Dr. Seals in to have consent discussion with patient. Pt signed consent form. Pt prepped for surgery. Pt shaved, saged washed. Pneumoboots applied.  Bicitra given to patient. Anesthesia in to see pt. No further concerns or questions from patient. NICU to be present at delivery. Will continue to support needs as able.

## 2017-02-26 NOTE — ANESTHESIA PROCEDURE NOTES
Peripheral Nerve Block Procedure Note    Staff:     Anesthesiologist:  MILLY LAU  Location: OB and PACU  Procedure Start/Stop TImes:      2/25/2017 8:30 PM     2/25/2017 8:44 PM    patient identified, IV checked, site marked, risks and benefits discussed, informed consent, monitors and equipment checked, pre-op evaluation, at physician/surgeon's request and post-op pain management      Correct Patient: Yes      Correct Position: Yes      Correct Site: Yes      Correct Procedure: Yes      Correct Laterality:  Yes    Site Marked:  Yes  Procedure details:     Procedure:  TAP    ASA:  2    Laterality:  Bilateral    Position:  Supine    Sterile Prep: chloraprep, mask and sterile gloves      Local skin infiltration:  None    Needle:  Short bevel and insulated    Needle gauge:  21    Needle length (inches):  4    Ultrasound: Yes      Ultrasound used to identify targeted nerve, plexus, or vascular structure and placed a needle adjacent to it      Permanent Image entered into patiient's record      Abnormal pain on injection: No      Blood Aspirated: No      Paresthesias:  No    Bleeding at site: No      Bolus via:  Needle    Infusion Method:  Single Shot    Complications:  None  Assessment/Narrative:     Injection made incrementally with aspirations every (mL):  5     The risks, benefits, and alternatives of the procedure were explained to the patient including bleeding, infection, and nerve injury. The patient consents to proceed. All questions were answered.

## 2017-02-26 NOTE — PROGRESS NOTES
"SALTY courtesy visit.    Infant: \"Ángel Mcdonald\"  Breastfeeding initiated.  Isamar Aldridge CNM    "

## 2017-02-27 PROCEDURE — 25000132 ZZH RX MED GY IP 250 OP 250 PS 637: Performed by: OBSTETRICS & GYNECOLOGY

## 2017-02-27 PROCEDURE — 12000030 ZZH R&B OB INTERMEDIATE UMMC

## 2017-02-27 RX ORDER — FERROUS SULFATE 325(65) MG
325 TABLET ORAL
Qty: 60 TABLET | Refills: 0 | Status: SHIPPED | OUTPATIENT
Start: 2017-02-27 | End: 2017-05-17

## 2017-02-27 RX ORDER — OXYCODONE HYDROCHLORIDE 5 MG/1
5-10 TABLET ORAL EVERY 4 HOURS PRN
Qty: 20 TABLET | Refills: 0 | Status: SHIPPED | OUTPATIENT
Start: 2017-02-27 | End: 2017-05-17

## 2017-02-27 RX ORDER — IBUPROFEN 600 MG/1
600 TABLET, FILM COATED ORAL EVERY 6 HOURS PRN
Qty: 40 TABLET | Refills: 0 | Status: SHIPPED | OUTPATIENT
Start: 2017-02-27 | End: 2017-05-17

## 2017-02-27 RX ORDER — ACETAMINOPHEN 325 MG/1
650 TABLET ORAL EVERY 4 HOURS PRN
Qty: 50 TABLET | Refills: 0 | Status: SHIPPED | OUTPATIENT
Start: 2017-02-28 | End: 2017-05-17

## 2017-02-27 RX ORDER — AMOXICILLIN 250 MG
1-2 CAPSULE ORAL 2 TIMES DAILY
Qty: 60 TABLET | Refills: 0 | Status: SHIPPED | OUTPATIENT
Start: 2017-02-27 | End: 2017-05-17

## 2017-02-27 RX ADMIN — IBUPROFEN 800 MG: 400 TABLET ORAL at 06:11

## 2017-02-27 RX ADMIN — OXYCODONE HYDROCHLORIDE 5 MG: 5 TABLET ORAL at 01:34

## 2017-02-27 RX ADMIN — SENNOSIDES AND DOCUSATE SODIUM 2 TABLET: 8.6; 5 TABLET ORAL at 08:57

## 2017-02-27 RX ADMIN — OXYCODONE HYDROCHLORIDE 5 MG: 5 TABLET ORAL at 20:02

## 2017-02-27 RX ADMIN — OXYCODONE HYDROCHLORIDE 5 MG: 5 TABLET ORAL at 14:33

## 2017-02-27 RX ADMIN — IBUPROFEN 800 MG: 400 TABLET ORAL at 12:38

## 2017-02-27 RX ADMIN — ACETAMINOPHEN 975 MG: 325 TABLET, FILM COATED ORAL at 06:16

## 2017-02-27 RX ADMIN — OXYCODONE HYDROCHLORIDE 5 MG: 5 TABLET ORAL at 09:40

## 2017-02-27 RX ADMIN — SIMETHICONE CHEW TAB 80 MG 80 MG: 80 TABLET ORAL at 08:57

## 2017-02-27 RX ADMIN — ACETAMINOPHEN 975 MG: 325 TABLET, FILM COATED ORAL at 22:36

## 2017-02-27 RX ADMIN — SENNOSIDES AND DOCUSATE SODIUM 2 TABLET: 8.6; 5 TABLET ORAL at 19:37

## 2017-02-27 RX ADMIN — SIMETHICONE CHEW TAB 80 MG 80 MG: 80 TABLET ORAL at 14:33

## 2017-02-27 NOTE — PLAN OF CARE
Problem: Goal Outcome Summary  Goal: Goal Outcome Summary  Outcome: No Change  [id number]  Patient had no issues thus far. Will continue with current plan of care.

## 2017-02-27 NOTE — PROGRESS NOTES
Post Partum Progress Note  POD#2    Subjective:  She is resting comfortably in bed this morning.  Complains of abdominal and incisional pain. Overall though pain is well controlled on current medication regimen. Tolerating PO intake. Lochia present and like peak flow menses.  Voiding without difficulty. Ambulating without dizziness or difficulty.  She denies headache, changes in vision, nausea/vomiting, chest pain, shortness of breath, RUQ pain, or worsening edema.  Is breastfeeding.    Objective:  Vitals:    17 1400 17 1611 17 2021 17 0135   BP: 116/84 102/70 113/75 102/62   Pulse:       Resp: 16 16 16 16   Temp:  98.3  F (36.8  C) 98.3  F (36.8  C) 97.9  F (36.6  C)   TempSrc:  Oral Oral Oral   SpO2:  97%         General: NAD. A&Ox3.  CV: RRR.  Pulm: Normal respiratory effort.  Abd: Soft, non-tender, non-distended. Fundus is firm and below the umbilicus.  Incision c/d/i.   Ext: 1+ ankle edema. No calf tenderness.    Assessment/Plan:  Fifi Hook is a 31 year old  female who is POD#2 s/p PLTCS for arrest of dilation.    - PNC: Rh positive. Rubella immune. No intervention indicated.  - Pain: controlled on oral medications  - Heme: Hgb 10.8> EBL 1500 (left O'Somis)> 9.  Will discharge home with iron.  - GI: continue anti-emetics and stool softeners as needed.  - : Voiding spontaneously.  - Infant: Stable in room  - Feeding: Plans on breastfeeding.  - BC: Plans IUD  - Encourage routine post-operative goals including ambulation and incentive spirometry    Discharge to home when meeting all post op goals    Simran Tineo MD  OBGYN PGY-2    Attestation:   This patient was seen and evaluated by me, separately from the house staff team. I have reviewed the note/plan above and agree.     Doing well, but more pain noted this am than yesterday.  Has started taking oxy and that is working well. Routine cares discussed and plan for discharge home tomorrow. Was anemic prior to surgery  and acute blood loss anemia discussed, plan iron at discharge and recheck hgb at pp visit.  No sx of anemia now.    Agnes Seals MD

## 2017-02-27 NOTE — LACTATION NOTE
"This note was copied from a baby's chart.  S: \"I am feeling better about having a , at first it was hard to think about because it was not the birth I had planned. I think we are doing pretty good with breastfeeding but I do have some discomfort when he feeds but not all the time\".    O: Baby chucky Neal delivered via primary  at 40.6 wks gestation at 3881 gr and at 24 hours of life he was at 96% of BW at 3725 gr. He is being fed on demand and is feeding more than 8 times in 24 hours. This is mother's first baby.     Breast exam of the mother: small bruises across both nipples, everted nipples,  colostrum present, soft breasts, changed with pregnancy.    Oral exam of baby: ankyloglossia present, monitor, mother has damage feels some discomfort some times, baby primarily sucks however he periodically chomps, mother's nipple is slightly pinched after feedings.     A: Baby was demonstrating cues and after mother demonstrated hand massage and expression LC suggested mother try holding her breast in a u vs a c to help support the breast for Ángel. Mother used very good technique and was able to demonstrate back to LC how to capture the chin in the areola and therefore maintain a good deep latch. Mother stated she felt comfortable but continued to notice some discomfort but not through the feeding.  provided LC contact for discharge.    P: Continue with feeding plan in progress, monitor for discomfort with feeds and nipple damage.   "

## 2017-02-27 NOTE — PLAN OF CARE
Problem: Goal Outcome Summary  Goal: Goal Outcome Summary  Outcome: Improving  Data: Vital signs within normal limits. Postpartum checks within normal limits - see flow record. Patient eating and drinking normally. Patient able to empty bladder independently and is up ambulating. No apparent signs of infection. Incision healing well. Patient performing self cares and is able to care for infant.  Action: Pain has been adequately managed with Tylenol, Ibuprofen, and Oxycodone. Patient education done about breastfeeding holds and ways to comfort baby when baby is crying.  Response: Positive attachment behaviors observed with infant. Support person, Will, present.   Plan: Continue with the plan of care.

## 2017-02-27 NOTE — PLAN OF CARE
Problem: Goal Outcome Summary  Goal: Goal Outcome Summary  Outcome: Improving  Data: Vital signs within normal limits. Postpartum checks within normal limits - see flow record. Patient eating and drinking normally. Patient able to empty bladder independently and is up ambulating. No apparent signs of infection. Incision healing well. Patient performing self cares and is able to care for infant.  Action: Pain has been adequately managed with Tylenol and Ibuprofen. Patient education done about hand expression, breastfeeding holds, burping baby, and swaddling baby.   Response: Positive attachment behaviors observed with infant. Support person, spouse: Will, present.   Plan: Continue with the plan of care.

## 2017-02-27 NOTE — PLAN OF CARE
Problem: Goal Outcome Summary  Goal: Goal Outcome Summary  Outcome: Improving  [id number]  Data: Vital signs within normal limits. Postpartum checks within normal limits - see flow record. Patient eating and drinking normally. Patient able to empty bladder independently and is up ambulating. No apparent signs of infection. incisoin JAVED.Patient performing self cares and is able to care for infant.  Action: Patient medicated during the shift for incisional. See MAR. Patient reassessed within 1 hour after each medication and pain was improved - patient stated she was comfortable. Patient education done about breast feeding done.   Response: Positive attachment behaviors observed with infant. Support persons present.   Plan: Anticipate discharge on 2/28/2017

## 2017-02-27 NOTE — PLAN OF CARE
Problem: Goal Outcome Summary  Goal: Goal Outcome Summary  Outcome: Improving  Patient stable. Taking tylenol and toradol for pain control. Breast feeding infant with minimal assist for deeper latch. Ambulated in hallway this evening. Continue routine care.

## 2017-02-28 ENCOUNTER — LACTATION ENCOUNTER (OUTPATIENT)
Age: 32
End: 2017-02-28

## 2017-02-28 VITALS
SYSTOLIC BLOOD PRESSURE: 120 MMHG | DIASTOLIC BLOOD PRESSURE: 88 MMHG | RESPIRATION RATE: 18 BRPM | TEMPERATURE: 98.2 F | OXYGEN SATURATION: 98 % | HEART RATE: 93 BPM

## 2017-02-28 PROCEDURE — 25000132 ZZH RX MED GY IP 250 OP 250 PS 637: Performed by: OBSTETRICS & GYNECOLOGY

## 2017-02-28 RX ADMIN — ACETAMINOPHEN 975 MG: 325 TABLET, FILM COATED ORAL at 06:30

## 2017-02-28 RX ADMIN — OXYCODONE HYDROCHLORIDE 5 MG: 5 TABLET ORAL at 00:07

## 2017-02-28 RX ADMIN — IBUPROFEN 800 MG: 400 TABLET ORAL at 05:20

## 2017-02-28 RX ADMIN — IBUPROFEN 800 MG: 400 TABLET ORAL at 11:49

## 2017-02-28 NOTE — LACTATION NOTE
"This note was copied from a baby's chart.  S: \"We have been giving him some formula and he just had some so I don't think he will want to breastfeed now\".    O: Baby chucky Neal delivered via primary  at 40.6 wks gestation at 3884 gr and he is now at 91% of BW at 3530 gr. Baby has ankyloglossia and a receding chin.  Baby was exclusively breast fed until early this morning and then mother because her nipples hurt so much felt she could not put her baby to the breast and wanted to pump. This is mother's first baby.     Breast exam of the mother: reddened left nipple, right nipple cracked, nipples nathalia, breasts heavy, large.    A: Upon  visit baby had just had a frenotomy.  Baby fell asleep right after the procedure.  encouraged mother to use hydrogels and to breast rest the right side if she did not feel she could bring baby to the breast.  encouraged mother to double  pump after every other feed if she is breast resting. Encouraged mother to feed back any and all EBM to baby.  provided contact and parents will have an appt to meet with  on 3/3/17 at 9am.     P: Parents verbalized understanding of education.   "

## 2017-02-28 NOTE — PLAN OF CARE
Problem: Goal Outcome Summary  Goal: Goal Outcome Summary  Outcome: Improving  PP assessment WNL. Vital signs stable. Pt intake and output remains appropriate. Up ad alexus, steady gait. Pain managed with tylenol and Roxicodone 5mg for incisional pain-see MAR. Pt breastfeeding infant on cue, latch assessed and is appropriate. Utilizing double electric breast pump and hand expression after feedings, is able to express small amounts of colostrum. Pt experiencing bilateral nipple symptoms, hydrogels utilized. Parents expressed concern about infant weight loss. [place] 9. Parents were very overwhelmed this morning. Provided emotional support to mother and father, educated on shaken baby prevention and to walk away when feeling overwhelmed.   is in room, positive support system. No further concerns, will continue with pt plan of care.

## 2017-02-28 NOTE — PLAN OF CARE
Problem: Goal Outcome Summary  Goal: Goal Outcome Summary  Outcome: Adequate for Discharge Date Met:  02/28/17  Data: Vital signs within normal limits. Postpartum checks within normal limits - see flow record. Patient eating and drinking normally. Patient is up ambulating on the hallway and is able to empty bladder independently. Incision is clean and intact. No apparent signs of infection noted.  Patient performing self cares and is able to care for infant. Breastfeeding is improving and pt is also supplementing baby with EBM/ FORMULA. Nipples are sore and pt is using her colostrum and hydrogel for comfort.  Action: Patient medicated during the shift for pain control. See MAR. Checked latch and flange baby's lip. Encouraged deeper latching. Reviewed teaching and discharge instructions with pt. Checked ID band. Pt was given discharge medications and copies of the discharge instructions.   Response: Positive attachment behaviors observed with infant. Support person present.   Plan: pt discharge home today with baby.

## 2017-02-28 NOTE — PROGRESS NOTES
Post Partum Progress Note  POD#3    Subjective:  She is resting comfortably in bed this morning. Reports some nausea and diarrhea overnight, better this AM. Pain is improving and well controlled on current medication regimen. Tolerating PO intake. Lochia present and decreasing.  Voiding without difficulty.  Passing flatus. Ambulating without dizziness or difficulty.  She denies headache, changes in vision, nausea/vomiting, chest pain, shortness of breath, RUQ pain, or worsening edema.  Breastfeeding.    Objective:  Vitals:    17 0853 17 1516 17 1943 17 0007   BP: 115/85 120/84 122/85 120/74   Pulse:  102 87 87   Resp: 16 16 16 16   Temp: 98  F (36.7  C) 97.8  F (36.6  C) 98.3  F (36.8  C) 98.7  F (37.1  C)   TempSrc: Oral  Oral Oral   SpO2:    98%     General: NAD. A&Ox3.  CV: RRR.  Pulm: CTAB. Normal respiratory effort.  Abd: Soft, non-tender, non-distended. Fundus is firm and below the umbilicus.  Incision c/d/i.  Ext: scant edema.    Assessment/Plan:  Fifi Hook is a 31 year old  female who is POD#3 s/p PLTCS for arrest of dilation.    - PNC: Rh positive. Rubella immune. No intervention indicated.  - Pain: controlled on oral medications  - Heme: Hgb 10.8> EBL 1500 (Left [place])> 9.  Acute blood loss anemia, Will discharge home with iron.  - GI: continue anti-emetics and stool softeners as needed.  - : Voiding spontaneously.  - Infant: Stable in room  - Feeding: Plans on breastfeeding.  - BC: Plans IUD  - Encourage routine post-operative goals including ambulation and incentive spirometry    Discharge to home likely today    Simarn Tineo MD  OBGYN PGY-2  5:03 AM 2017      Patient seen and examined by me, agree with above resident note. Overall doing well, ok for d/c.  Instructions given.  RTC 6 weeks     TOMA MARIN MD

## 2017-02-28 NOTE — PLAN OF CARE
Problem: Goal Outcome Summary  Goal: Goal Outcome Summary  Outcome: Improving  Data: Vital signs within normal limits. Postpartum checks within normal limits - see flow record. Patient eating and drinking normally. Patient able to empty bladder independently and is up ambulating. No apparent signs of infection. Incision healing well. Patient performing self cares and is able to care for infant.  Action: Pain has been adequately managed with Tylenol and Oxycodone. Pt also using hot packs for comfort.  Pt education done on how to set up and use the breast pump.   Response: Positive attachment behaviors observed with infant. Support person, spouse: Will, present.   Plan: Continue with the plan of care.

## 2017-03-01 ENCOUNTER — TELEPHONE (OUTPATIENT)
Dept: PEDIATRICS | Facility: CLINIC | Age: 32
End: 2017-03-01

## 2017-03-01 DIAGNOSIS — O92.70 LACTATION DISORDER: Primary | ICD-10-CM

## 2017-03-01 RX ORDER — MUPIROCIN 20 MG/G
OINTMENT TOPICAL
Qty: 22 G | Refills: 1 | Status: SHIPPED | OUTPATIENT
Start: 2017-03-01 | End: 2017-05-17

## 2017-05-17 ENCOUNTER — PRENATAL OFFICE VISIT (OUTPATIENT)
Dept: MIDWIFE SERVICES | Facility: CLINIC | Age: 32
End: 2017-05-17
Payer: COMMERCIAL

## 2017-05-17 VITALS
TEMPERATURE: 97.8 F | WEIGHT: 147.3 LBS | BODY MASS INDEX: 24.51 KG/M2 | OXYGEN SATURATION: 96 % | HEART RATE: 107 BPM | SYSTOLIC BLOOD PRESSURE: 98 MMHG | DIASTOLIC BLOOD PRESSURE: 78 MMHG

## 2017-05-17 DIAGNOSIS — Z30.430 ENCOUNTER FOR IUD INSERTION: ICD-10-CM

## 2017-05-17 DIAGNOSIS — F41.9 ANXIETY: ICD-10-CM

## 2017-05-17 LAB — BETA HCG QUAL IFA URINE: NEGATIVE

## 2017-05-17 PROCEDURE — 99207 ZZC POST PARTUM EXAM: CPT | Performed by: ADVANCED PRACTICE MIDWIFE

## 2017-05-17 PROCEDURE — 58300 INSERT INTRAUTERINE DEVICE: CPT | Performed by: ADVANCED PRACTICE MIDWIFE

## 2017-05-17 PROCEDURE — 84703 CHORIONIC GONADOTROPIN ASSAY: CPT | Performed by: ADVANCED PRACTICE MIDWIFE

## 2017-05-17 RX ORDER — CITALOPRAM HYDROBROMIDE 10 MG/1
10 TABLET ORAL DAILY
Qty: 90 TABLET | Refills: 1 | Status: SHIPPED | OUTPATIENT
Start: 2017-05-17 | End: 2018-02-15

## 2017-05-17 NOTE — MR AVS SNAPSHOT
After Visit Summary   5/17/2017    Fifi Hook    MRN: [id number]           Patient Information     Date Of Birth          1985        Visit Information        Provider Department      5/17/2017 1:45 PM [facility]        Today's Diagnoses     Routine postpartum follow-up    -  1    Encounter for IUD insertion        Anxiety          Care Instructions    What Paragard Users May Expect    What to watch for right after Paragard is placed  Some women may experience uterine cramps, bleeding, and/or dizziness during and right after Paragard is placed. To help minimize the cramps, you may taken ibuprofen 600 mg with food prior to your appointment. These symptoms should improve over the next 24 hours.  Mild cramping may be present for a few days after your placement  As a follow up, you should check your strings in 4 weeks, or visit your clinic once in the first 4 to 12 weeks after Paragard is placed to make sure it is in the right position. After that, Paragard can be checked once a year as part of your routine exam.    Please use a back-up method (abstinence or condoms) for 5 days after placement.    Your periods may change  Some women may have heavier and longer periods with cramping for a while; some may have spotting between periods. These side effects usually lessen after a few months. However, if your menstrual flow is severe or prolonged, call your healthcare professional. You should also call your healthcare professional if you miss a period.    Paragard Strings  You may check your own Paragard strings by inserting a finger into the vagina and feeling the strings as they exit the cervix.  The strings will initially feel firm, but will soften over a few weeks.  After the strings have softened, you or your partner should not be able to feel the strings during intercourse.  If you can feel the IUD, see your healthcare provider to have the position  confirmed.  You may continue to use tampons with the IUD in place.    Paragard does not protect against HIV or STDs.  Paragard does not prevent the formation of ovarian cysts.  Paragard does not typically reduce acne or cause weight gain or mood changes.    Please call [facility] at [phone] if you have questions or concerns.    For more information:  [email or web address]            Follow-ups after your visit        Who to contact     If you have questions or need follow up information about today's clinic visit or your schedule please contact [facility] directly at [phone].  Normal or non-critical lab and imaging results will be communicated to you by MyChart, letter or phone within 4 business days after the clinic has received the results. If you do not hear from us within 7 days, please contact the clinic through [employer] or phone. If you have a critical or abnormal lab result, we will notify you by phone as soon as possible.  Submit refill requests through [employer] or call your pharmacy and they will forward the refill request to us. Please allow 3 business days for your refill to be completed.          Additional Information About Your Visit        MyChart Information     [employer] gives you secure access to your electronic health record. If you see a primary care provider, you can also send messages to your care team and make appointments. If you have questions, please call your primary care clinic.  If you do not have a primary care provider, please call [phone] and they will assist you.        Care EveryWhere ID     This is your Care EveryWhere ID. This could be used by other organizations to access your [place] medical records  [id number]        Your Vitals Were     Pulse Temperature Last Period Pulse Oximetry Breastfeeding? BMI (Body Mass Index)    107 97.8  F (36.6  C) (Oral) 05/15/2016 96% Yes 24.51 kg/m2       Blood Pressure from Last 3  Encounters:   05/17/17 98/78   02/28/17 120/88   02/24/17 118/84    Weight from Last 3 Encounters:   05/17/17 147 lb 4.8 oz (66.8 kg)   02/20/17 165 lb 14.4 oz (75.3 kg)   02/13/17 163 lb (73.9 kg)              We Performed the Following     Beta HCG qual IFA urine     INSERTION INTRAUTERINE DEVICE          Today's Medication Changes          These changes are accurate as of: 5/17/17 11:59 PM.  If you have any questions, ask your nurse or doctor.               Start taking these medicines.        Dose/Directions    citalopram 10 MG tablet   Commonly known as:  celeXA   Used for:  Anxiety   Started by:  Shayna Samuels APRN CNM        Dose:  10 mg   Take 1 tablet (10 mg) by mouth daily   Quantity:  90 tablet   Refills:  1       paragard intrauterine copper   Used for:  Encounter for IUD insertion   Started by:  Shayna Samuels APRN CNM        Dose:  1 each   1 each by Intrauterine route once for 1 dose   Quantity:  1 each   Refills:  0            Where to get your medicines      Some of these will need a paper prescription and others can be bought over the counter.  Ask your nurse if you have questions.     Bring a paper prescription for each of these medications     citalopram 10 MG tablet       You don't need a prescription for these medications     paragard intrauterine copper                Primary Care Provider    None Specified       No primary provider on file.        Thank you!     Thank you for choosing [facility]  for your care. Our goal is always to provide you with excellent care. Hearing back from our patients is one way we can continue to improve our services. Please take a few minutes to complete the written survey that you may receive in the mail after your visit with us. Thank you!             Your Updated Medication List - Protect others around you: Learn how to safely use, store and throw away your medicines at www.disposemymeds.org.          This list is accurate as of:  5/17/17 11:59 PM.  Always use your most recent med list.                   Brand Name Dispense Instructions for use    citalopram 10 MG tablet    celeXA    90 tablet    Take 1 tablet (10 mg) by mouth daily       paragard intrauterine copper     1 each    1 each by Intrauterine route once for 1 dose

## 2017-05-17 NOTE — PATIENT INSTRUCTIONS
What Paragard Users May Expect    What to watch for right after Paragard is placed  Some women may experience uterine cramps, bleeding, and/or dizziness during and right after Paragard is placed. To help minimize the cramps, you may taken ibuprofen 600 mg with food prior to your appointment. These symptoms should improve over the next 24 hours.  Mild cramping may be present for a few days after your placement  As a follow up, you should check your strings in 4 weeks, or visit your clinic once in the first 4 to 12 weeks after Paragard is placed to make sure it is in the right position. After that, Paragard can be checked once a year as part of your routine exam.    Please use a back-up method (abstinence or condoms) for 5 days after placement.    Your periods may change  Some women may have heavier and longer periods with cramping for a while; some may have spotting between periods. These side effects usually lessen after a few months. However, if your menstrual flow is severe or prolonged, call your healthcare professional. You should also call your healthcare professional if you miss a period.    Paragard Strings  You may check your own Paragard strings by inserting a finger into the vagina and feeling the strings as they exit the cervix.  The strings will initially feel firm, but will soften over a few weeks.  After the strings have softened, you or your partner should not be able to feel the strings during intercourse.  If you can feel the IUD, see your healthcare provider to have the position confirmed.  You may continue to use tampons with the IUD in place.    Paragard does not protect against HIV or STDs.  Paragard does not prevent the formation of ovarian cysts.  Paragard does not typically reduce acne or cause weight gain or mood changes.    Please call [facility] at [phone] if you have questions or concerns.    For more information:  [email or web address]

## 2017-05-20 RX ORDER — COPPER 313.4 MG/1
1 INTRAUTERINE DEVICE INTRAUTERINE ONCE
Qty: 1 EACH | Refills: 0
Start: 2017-05-20 | End: 2017-05-20

## 2017-05-20 NOTE — PROGRESS NOTES
SUBJECTIVE:   Fifi Hook is here for her 6-week postpartum checkup.     HPI: Patient feels that recovery is going well at almost three months postpartum. She would like a Paragard IUD placed today.     DELIVERY DATE: 2/25/17  c/s for FTP of a viable boy, weight 8 pounds 9 oz., with no complications.  FEEDING METHOD:     CONTRACEPTION PLANNED: Parguard IUD  She  has not had intercourse since delivery and complains of No discomfort.  HX OF DEPRESSION:No  HX OF ABUSE:No  OTHER HPI: She has no signs of infection, bleeding or other complications. Bleeding stopped, epis/lac healing well.         EXAM:  BP 98/78  Pulse 107  Temp 97.8  F (36.6  C) (Oral)  Wt 147 lb 4.8 oz (66.8 kg)  LMP 05/15/2016  SpO2 96%  Breastfeeding? Yes  BMI 24.51 kg/m2  GENERAL APPEARANCE: healthy, alert and no distress  NECK: thyroid normal to palpation  BREAST: soft, nontender, nipples intact, lactating   ABDOMEN: soft, nontender, diastasis recti closing  PSYCH: mentation appears normal and affect normal/bright    ASSESSMENT:   Normal postpartum exam after primary CS.    PLAN:  Birth Control as ordered. Fertility reviewed.    Return as needed or at time interval for next routine pap, pelvic, or breast exam.  Encourage Kegals and abdominal exercise. Slow, steady weight loss.  Continue a multi vitamin supplement, especially if breastfeeding.  Pap smear was not obtained.  GC/CHLAMYDIA CULTURE OBTAINED:NO   Post partum Hgb was not obtained.    IUD PLACEMENT  Chief Complaint/ History of Present Illness:Fifi Hook is a 32 year old  female.   Patient's last menstrual period was 05/15/2016..  Today's pregnancy test negative.  She is here for an IUD insertion.  Patient has been given written information.  I have reviewed the risks of the IUD including pregnancy, PID, life threatening infection, perforation, expulsion, cramping, changes in bleeding and ovarian cysts. Benefits of the IUD and alternative family  planning methods have been discussed.  Patients questions are answered.  Patient has verbalized understanding of risks and benefits and has signed the consent form.    No Known Allergies  Current Outpatient Prescriptions   Medication Sig Dispense Refill     citalopram (CELEXA) 10 MG tablet Take 1 tablet (10 mg) by mouth daily 90 tablet 1      Past Medical History:   Diagnosis Date     Patient denies medical problems      Past Surgical History:   Procedure Laterality Date     C ORAL SURGERY PROCEDURE        SECTION N/A 2017    Procedure:  SECTION;  Surgeon: Agnes Seals MD;  Location: [facility] L[facility]     EXTRACTION(S) DENTAL         REVIEW OF SYSTEMS  CONSTITUTIONAL: Denies fever, chills and night sweats  BREASTS:  Denies discharge and lumps.    HEART/LUNGS: Denies dyspnea, wheezing, coughing and chest pain.  HEMATOLOGIC: Denies tendency to bruise and history of blood clots.  GENITOURINARY:  Denies urgency, dysuria and hematuria.  NEUROLOGIC:  Denies seizures, weakness and fainting.  PSYCHIATRIC: Negative for changes in mood or affect      EXAM:  BP 98/78  Pulse 107  Temp 97.8  F (36.6  C) (Oral)  Wt 147 lb 4.8 oz (66.8 kg)  LMP 05/15/2016  SpO2 96%  Breastfeeding? Yes  BMI 24.51 kg/m2    Abdomen: soft, nontender, without hepatosplenomegaly or masses  : normal cervix, adnexae, and uterus without masses or discharge  IUD type: Paragard T-380 NDC 25192-085-52, LOT:[id number], EXP:2023    Procedure:  Uterus assessed for position and is Anteverted.  Speculum inserted.  Betadine prep of cervix done.  Tenaculum applied at 12 o'clock position and gentle traction was applied to elongate the cervical canal.  Uterus sounded to 6.5cm's.  Cervical dilators not used.   IUD inserted in the usual fashion without problems, ie: resistance, severe protracted pain or excessive bleeding.  Tenaculum was removed with scant bleeding from the tenaculum site that was managed with some direct pressure using Moreno  swabs.  Strings trimmed to 2 cm's.  Patient tolerated the procedure well without any prolonged pain or syncopy.      ASSESSMENT/ PLAN:  (Z30.430) Encounter for IUD insertion  (primary encounter diagnosis)  Comment:   Plan: Beta HCG qual IFA urine            (F41.9) Anxiety  Comment:   Plan: citalopram (CELEXA) 10 MG tablet            GC/Chlamydia Screening:  NO       Instructions given to patient regarding checking IUD strings, returning to the clinic if pain or inability to check strings and/or irregular bleeding.  Shayna Samuels CNM

## 2017-06-01 ENCOUNTER — TELEPHONE (OUTPATIENT)
Dept: OBGYN | Facility: CLINIC | Age: 32
End: 2017-06-01

## 2017-06-01 DIAGNOSIS — Z13.88 SCREENING FOR LEAD EXPOSURE: Primary | ICD-10-CM

## 2017-06-01 NOTE — TELEPHONE ENCOUNTER
"----- Message from Myrna Hunter MD sent at 6/1/2017 11:06 AM CDT -----  Regarding: Lead testing  I received the email below from the [facility] regarding inaccurate lead testing at the [facility]. Apparently, the lab values have been inaccurate and they recommend offering retesting to patients that may have had inaccurate testing.    Can you call the patient and offer her retesting? I don't think it is absolutely necessary for her to be retested, but the testing that she had in August (which showed a normal level) may have been inaccurate.    Thanks!  Myrna    Here's the email below:  \"On May 17, 2017, the U.S. Food and Drug Administration and the Centers for Disease Control and Prevention issued an official health alert warning that venous blood lead test results on the [employer]' LeadCare instruments may be falsely low. Whole blood capillary specimens (fingerstick or heel stick) are not affected.     The [facility] has performed lead testing on the LeadCare Ultra instrument since August 24, 2016. Therefore, any venous samples tested for lead since that date would be impacted.     Here is a list of your patients we have identified that had venous blood levels collected who are candidates for re-testing:          The CDC is recommending re-testing for the following patients:               Children less than 6 years of age as of May 17, 2017 who had their sample drawn from a vein. These children should be re-tested if they had a result of less than 5 micrograms per deciliter (5 micrograms/dL).  Results greater than or equal to 5 micrograms/dL are routinely confirmed by a reference laboratory and therefore do not require re-testing.            Women who had sample drawn from a vein while pregnant or nursing; if they had a level less than 5 micrograms/dL they should be re-tested.     Additionally, we will re-test ANY patient with a level less than 5 micrograms/dL who does NOT " "meet the CDC criteria, if you feel they are at risk for an elevated blood lead level.     Please contact the patients who should be re-tested and arrange to have their blood recollected if they meet the CDC guidelines or if you feel the patient is at risk for elevated lead levels. The specimen requirement for venous collections is 7 mL whole blood (royal blue), minimum: 0.5 mL.     For this testing to be performed at NO CHARGE, please send an email to [email or web address] and [email or web address]  with the names and MRN of patients being recollected.\"  "

## 2017-06-01 NOTE — TELEPHONE ENCOUNTER
Pilar from lab returned my call and yes Fifi should have her lead re-tested and will not be charge.    I called and left above message on Fifi's cell. I gave her RN Triage number to call if any questions otherwise go to outpt lab at her convenience

## 2017-06-01 NOTE — TELEPHONE ENCOUNTER
See message below from Dr Myrna Hunter.  Left vm for Fifi that her lead lab test from 8/31/16 maybe inaccurate so ordering another lab test and she can go at her convenience.    Spoke to  to find out if this will be compensated. She will let triage know. I gave her MRN to check.

## 2018-01-25 NOTE — PROGRESS NOTES
"  SUBJECTIVE:   Fifi Hook is a 32 year old female who presents to clinic today for the following health issues:    Wants to talk about possibly having ADHD  Problems with procrastination and now it seems like she needs to do something about it  Wants to see what a provider thinks    Her office has an \"open office plan\" and finding it harder to get things done  Her job is ending soon and will have to be looking for a new one so her stress is pretty high. Anxiety overall is high right now but she doesn't see herself as an anxious person in general.   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  Additional history: as documented    ROS:  C: NEGATIVE for fever, chills, change in weight  ENDOCRINE: NEGATIVE for temperature intolerance, skin/hair changes  PSYCHIATRIC: NEGATIVE forthoughts of hurting someone else and thoughts of self harm    Patient Active Problem List   Diagnosis     Low back pain during pregnancy, second trimester     Supervision of normal first pregnancy     Need for Tdap vaccination     GBS (group B Streptococcus carrier), +RV culture, currently pregnant     Threatened labor at term     Normal labor and delivery     Encounter for postoperative care     Past Surgical History:   Procedure Laterality Date     C ORAL SURGERY PROCEDURE        SECTION N/A 2017    Procedure:  SECTION;  Surgeon: Agnes Seals MD;  Location:  L+D     EXTRACTION(S) DENTAL         Social History   Substance Use Topics     Smoking status: Never Smoker     Smokeless tobacco: Never Used     Alcohol use No     Family History   Problem Relation Age of Onset     Depression Mother      Depression Maternal Grandmother      Asthma Brother      DIABETES Maternal Uncle  " "    Breast Cancer No family hx of      Colon Cancer No family hx of            Problem list, Medication list, Allergies, and Medical/Social/Surgical histories reviewed in [facility] and updated as appropriate.    OBJECTIVE:                                                    /72  Pulse 83  Temp 97.6  F (36.4  C) (Oral)  Ht 5' 5\" (1.651 m)  Wt 146 lb (66.2 kg)  SpO2 96%  BMI 24.3 kg/m2 Body mass index is 24.3 kg/(m^2).   GENERAL:  Alert and oriented x 3.  WD WN    PSYCH: Mood: \"ok\"   Affect: blunted and teary   Insight: good    Thought process: linear           ASSESSMENT/PLAN:                                                        ICD-10-CM    1. MADELIN (generalized anxiety disorder) F41.1 FLUoxetine (PROZAC) 10 MG capsule     hydrOXYzine (ATARAX) 25 MG tablet     MENTAL HEALTH REFERRAL  - Adult; Outpatient Treatment; Individual/Couples/Family/Group Therapy/Health Psychology; [facility] [place]: [facility] [phone]; We will contact you to schedule the appointment or please call with any questions   2. Situational anxiety F41.8 FLUoxetine (PROZAC) 10 MG capsule     hydrOXYzine (ATARAX) 25 MG tablet     MENTAL HEALTH REFERRAL  - Adult; Outpatient Treatment; Individual/Couples/Family/Group Therapy/Health Psychology; [facility] [place]: [facility] [phone]; We will contact you to schedule the appointment or please call with any questions       PLAN:  1) Health habits reviewed, and patient encouraged to avoid alcohol and exercise regularly.  2) Medications and duration of treatment discussed, possible side effects reviewed, and start prozac as directed. Hydroxyzine as needed.  3) Return appointment in 5-6 week.  4) Referral to therapy    Concentration difficulty most likely from situational anxiety.     Total time 25 minutes, greater than 50% counseling which is discussed in plan above and in patient instructions.   Patient Instructions   Start L-theanine 200 mg up to three times daily  Start prozac as " "needed  Use hydroxyzine as needed  Follow up with therapist  Follow up with me in 5-6 weeks  Return to clinic for any new or worsening symptoms or go to ER Urgent care in off hours          Estimated body mass index is 24.3 kg/(m^2) as calculated from the following:    Height as of this encounter: 5' 5\" (1.651 m).    Weight as of this encounter: 146 lb (66.2 kg).       Chel Figueroa  [facility]      "

## 2018-01-26 ENCOUNTER — OFFICE VISIT (OUTPATIENT)
Dept: FAMILY MEDICINE | Facility: CLINIC | Age: 33
End: 2018-01-26
Payer: COMMERCIAL

## 2018-01-26 VITALS
SYSTOLIC BLOOD PRESSURE: 110 MMHG | WEIGHT: 146 LBS | BODY MASS INDEX: 24.32 KG/M2 | TEMPERATURE: 97.6 F | DIASTOLIC BLOOD PRESSURE: 72 MMHG | HEART RATE: 83 BPM | OXYGEN SATURATION: 96 % | HEIGHT: 65 IN

## 2018-01-26 DIAGNOSIS — F41.1 GAD (GENERALIZED ANXIETY DISORDER): Primary | ICD-10-CM

## 2018-01-26 DIAGNOSIS — F41.8 SITUATIONAL ANXIETY: ICD-10-CM

## 2018-01-26 PROCEDURE — 99214 OFFICE O/P EST MOD 30 MIN: CPT | Performed by: PHYSICIAN ASSISTANT

## 2018-01-26 RX ORDER — FLUOXETINE 10 MG/1
10 CAPSULE ORAL DAILY
Qty: 60 CAPSULE | Refills: 0 | Status: SHIPPED | OUTPATIENT
Start: 2018-01-26

## 2018-01-26 RX ORDER — HYDROXYZINE HYDROCHLORIDE 25 MG/1
25-50 TABLET, FILM COATED ORAL
Qty: 60 TABLET | Refills: 1 | Status: SHIPPED | OUTPATIENT
Start: 2018-01-26

## 2018-01-26 ASSESSMENT — PATIENT HEALTH QUESTIONNAIRE - PHQ9: 5. POOR APPETITE OR OVEREATING: NOT AT ALL

## 2018-01-26 ASSESSMENT — ANXIETY QUESTIONNAIRES
6. BECOMING EASILY ANNOYED OR IRRITABLE: SEVERAL DAYS
3. WORRYING TOO MUCH ABOUT DIFFERENT THINGS: SEVERAL DAYS
1. FEELING NERVOUS, ANXIOUS, OR ON EDGE: SEVERAL DAYS
GAD7 TOTAL SCORE: 5
2. NOT BEING ABLE TO STOP OR CONTROL WORRYING: SEVERAL DAYS
5. BEING SO RESTLESS THAT IT IS HARD TO SIT STILL: SEVERAL DAYS
7. FEELING AFRAID AS IF SOMETHING AWFUL MIGHT HAPPEN: NOT AT ALL

## 2018-01-26 NOTE — PATIENT INSTRUCTIONS
Start L-theanine 200 mg up to three times daily  Start prozac as needed  Use hydroxyzine as needed  Follow up with therapist  Follow up with me in 5-6 weeks  Return to clinic for any new or worsening symptoms or go to ER Urgent care in off hours

## 2018-01-26 NOTE — MR AVS SNAPSHOT
After Visit Summary   1/26/2018    Fifi Hook    MRN: [id number]           Patient Information     Date Of Birth          1985        Visit Information        Provider Department      1/26/2018 8:20 AM Chel Figueroa PA-C [facility]        Today's Diagnoses     MADELIN (generalized anxiety disorder)    -  1    Situational anxiety          Care Instructions    Start L-theanine 200 mg up to three times daily  Start prozac as needed  Use hydroxyzine as needed  Follow up with therapist  Follow up with me in 5-6 weeks  Return to clinic for any new or worsening symptoms or go to ER Urgent care in off hours            Follow-ups after your visit        Additional Services     MENTAL HEALTH REFERRAL  - Adult; Outpatient Treatment; Individual/Couples/Family/Group Therapy/Health Psychology; [facility] [place]: [facility] [phone]; We will contact you to schedule the appointment or please call with any questions       All scheduling is subject to the client's specific insurance plan & benefits, provider/location availability, and provider clinical specialities.  Please arrive 15 minutes early for your first appointment and bring your completed paperwork.    Please be aware that coverage of these services is subject to the terms and limitations of your health insurance plan.  Call member services at your health plan with any benefit or coverage questions.                            Who to contact     If you have questions or need follow up information about today's clinic visit or your schedule please contact [facility] directly at [phone].  Normal or non-critical lab and imaging results will be communicated to you by MyChart, letter or phone within 4 business days after the clinic has received the results. If you do not hear from us within 7 days, please contact the clinic through MyChart or phone. If you have a critical or abnormal lab result,  "we will notify you by phone as soon as possible.  Submit refill requests through [employer] or call your pharmacy and they will forward the refill request to us. Please allow 3 business days for your refill to be completed.          Additional Information About Your Visit        Pit My [employer] Information     [employer] gives you secure access to your electronic health record. If you see a primary care provider, you can also send messages to your care team and make appointments. If you have questions, please call your primary care clinic.  If you do not have a primary care provider, please call [phone] and they will assist you.        Care EveryWhere ID     This is your Care EveryWhere ID. This could be used by other organizations to access your [place] medical records  [id number]        Your Vitals Were     Pulse Temperature Height Pulse Oximetry BMI (Body Mass Index)       83 97.6  F (36.4  C) (Oral) 5' 5\" (1.651 m) 96% 24.3 kg/m2        Blood Pressure from Last 3 Encounters:   01/26/18 110/72   05/17/17 98/78   02/28/17 120/88    Weight from Last 3 Encounters:   01/26/18 146 lb (66.2 kg)   05/17/17 147 lb 4.8 oz (66.8 kg)   02/20/17 165 lb 14.4 oz (75.3 kg)              We Performed the Following     MENTAL HEALTH REFERRAL  - Adult; Outpatient Treatment; Individual/Couples/Family/Group Therapy/Health Psychology; G: [facility] [phone]; We will contact you to schedule the appointment or please call with any questions          Today's Medication Changes          These changes are accurate as of 1/26/18  9:01 AM.  If you have any questions, ask your nurse or doctor.               Start taking these medicines.        Dose/Directions    FLUoxetine 10 MG capsule   Commonly known as:  PROzac   Used for:  Situational anxiety, MADELIN (generalized anxiety disorder)   Started by:  Chel Figueroa PA-C        Dose:  10 mg   Take 1 capsule (10 mg) by mouth daily For 1 week, then increase to 20 mg " daily   Quantity:  60 capsule   Refills:  0       hydrOXYzine 25 MG tablet   Commonly known as:  ATARAX   Used for:  MADELIN (generalized anxiety disorder), Situational anxiety   Started by:  Chel Figueroa PA-C        Dose:  25-50 mg   Take 1-2 tablets (25-50 mg) by mouth nightly as needed for other (insomnia)   Quantity:  60 tablet   Refills:  1            Where to get your medicines      These medications were sent to [facility] [address] [place] [address]  [address] [facility] [address]     Phone:  [phone]     FLUoxetine 10 MG capsule    hydrOXYzine 25 MG tablet                Primary Care Provider Office Phone # Fax #    Chel Figueroa PA-C [phone]       [address]  [facility] [address]        Equal Access to Services     CINDI Beach[facility] HospitalSTEPHANIE : Hadii ariel araujo hadasho Soomaali, waaxda luqadaha, qaybta kaalmada adeegyada, jones locke . So [facility] [phone].    ATENCIÓN: Si habla español, tiene a mathew disposición servicios gratuitos de asistencia lingüística. Teena al [phone].    We comply with applicable federal civil rights laws and [place] laws. We do not discriminate on the basis of race, color, national origin, age, disability, sex, sexual orientation, or gender identity.            Thank you!     Thank you for choosing [facility] [place]  for your care. Our goal is always to provide you with excellent care. Hearing back from our patients is one way we can continue to improve our services. Please take a few minutes to complete the written survey that you may receive in the mail after your visit with us. Thank you!             Your Updated Medication List - Protect others around you: Learn how to safely use, store and throw away your medicines at www.disposemymeds.org.          This list is accurate as of 1/26/18  9:01 AM.  Always use your most recent med list.                   Brand Name  Dispense Instructions for use Diagnosis    citalopram 10 MG tablet    celeXA    90 tablet    Take 1 tablet (10 mg) by mouth daily    Anxiety       FLUoxetine 10 MG capsule    PROzac    60 capsule    Take 1 capsule (10 mg) by mouth daily For 1 week, then increase to 20 mg daily    Situational anxiety, MADELIN (generalized anxiety disorder)       hydrOXYzine 25 MG tablet    ATARAX    60 tablet    Take 1-2 tablets (25-50 mg) by mouth nightly as needed for other (insomnia)    MADELIN (generalized anxiety disorder), Situational anxiety

## 2018-01-27 ASSESSMENT — ANXIETY QUESTIONNAIRES: GAD7 TOTAL SCORE: 5

## 2018-01-27 ASSESSMENT — PATIENT HEALTH QUESTIONNAIRE - PHQ9: SUM OF ALL RESPONSES TO PHQ QUESTIONS 1-9: 3

## 2018-02-12 ENCOUNTER — OFFICE VISIT (OUTPATIENT)
Dept: FAMILY MEDICINE | Facility: CLINIC | Age: 33
End: 2018-02-12
Payer: COMMERCIAL

## 2018-02-12 VITALS
BODY MASS INDEX: 24.06 KG/M2 | SYSTOLIC BLOOD PRESSURE: 110 MMHG | WEIGHT: 144.6 LBS | TEMPERATURE: 97.4 F | HEART RATE: 92 BPM | DIASTOLIC BLOOD PRESSURE: 78 MMHG | OXYGEN SATURATION: 98 %

## 2018-02-12 DIAGNOSIS — R68.89 FLU-LIKE SYMPTOMS: Primary | ICD-10-CM

## 2018-02-12 LAB
ERYTHROCYTE [DISTWIDTH] IN BLOOD BY AUTOMATED COUNT: 13.5 % (ref 10–15)
FLUAV+FLUBV AG SPEC QL: NEGATIVE
FLUAV+FLUBV AG SPEC QL: NEGATIVE
HCT VFR BLD AUTO: 41.1 % (ref 35–47)
HGB BLD-MCNC: 13.4 G/DL (ref 11.7–15.7)
MCH RBC QN AUTO: 27.3 PG (ref 26.5–33)
MCHC RBC AUTO-ENTMCNC: 32.6 G/DL (ref 31.5–36.5)
MCV RBC AUTO: 84 FL (ref 78–100)
PLATELET # BLD AUTO: 225 10E9/L (ref 150–450)
RBC # BLD AUTO: 4.9 10E12/L (ref 3.8–5.2)
SPECIMEN SOURCE: NORMAL
WBC # BLD AUTO: 7.3 10E9/L (ref 4–11)

## 2018-02-12 PROCEDURE — 36415 COLL VENOUS BLD VENIPUNCTURE: CPT | Performed by: FAMILY MEDICINE

## 2018-02-12 PROCEDURE — 87804 INFLUENZA ASSAY W/OPTIC: CPT | Performed by: FAMILY MEDICINE

## 2018-02-12 PROCEDURE — 85027 COMPLETE CBC AUTOMATED: CPT | Performed by: FAMILY MEDICINE

## 2018-02-12 PROCEDURE — 99213 OFFICE O/P EST LOW 20 MIN: CPT | Performed by: FAMILY MEDICINE

## 2018-02-12 NOTE — MR AVS SNAPSHOT
After Visit Summary   2/12/2018    Fifi Hook    MRN: [id number]           Patient Information     Date Of Birth          1985        Visit Information        Provider Department      2/12/2018 1:30 PM Gustavo Dc MD [facility] [place]        Today's Diagnoses     Flu-like symptoms    -  1       Follow-ups after your visit        Future tests that were ordered for you today     Open Future Orders        Priority Expected Expires Ordered    XR Chest 2 Views Routine 2/16/2018 2/12/2019 2/12/2018            Who to contact     If you have questions or need follow up information about today's clinic visit or your schedule please contact [facility] [place] [facility] directly at [phone].  Normal or non-critical lab and imaging results will be communicated to you by MyChart, letter or phone within 4 business days after the clinic has received the results. If you do not hear from us within 7 days, please contact the clinic through [employer] or phone. If you have a critical or abnormal lab result, we will notify you by phone as soon as possible.  Submit refill requests through [employer] or call your pharmacy and they will forward the refill request to us. Please allow 3 business days for your refill to be completed.          Additional Information About Your Visit        MyChart Information     [employer] gives you secure access to your electronic health record. If you see a primary care provider, you can also send messages to your care team and make appointments. If you have questions, please call your primary care clinic.  If you do not have a primary care provider, please call [phone] and they will assist you.        Care EveryWhere ID     This is your Care EveryWhere ID. This could be used by other organizations to access your [place] medical records  [id number]        Your Vitals Were     Pulse Temperature Pulse Oximetry BMI (Body Mass Index)          92 97.4  F (36.3   C) (Oral) 98% 24.06 kg/m2         Blood Pressure from Last 3 Encounters:   02/12/18 110/78   01/26/18 110/72   05/17/17 98/78    Weight from Last 3 Encounters:   02/12/18 144 lb 9.6 oz (65.6 kg)   01/26/18 146 lb (66.2 kg)   05/17/17 147 lb 4.8 oz (66.8 kg)              We Performed the Following     CBC with platelets     Influenza A/B antigen        Primary Care Provider Office Phone # Fax #    Chel Figueroa PA-C [phone]       [address] [facility] [address]  [facility] [address]        Equal Access to Services     BRITTANY GODINEZ : Hadii ariel fernándezo Aracelis, waaxda perlitaadaha, qaybta kaalmada adecindyyaandry, jones locke . So [facility] [phone].    ATENCIÓN: Si habla español, tiene a mathew disposición servicios gratuitos de asistencia lingüística. Llame al [phone].    We comply with applicable federal civil rights laws and [place] laws. We do not discriminate on the basis of race, color, national origin, age, disability, sex, sexual orientation, or gender identity.            Thank you!     Thank you for choosing [facility]  for your care. Our goal is always to provide you with excellent care. Hearing back from our patients is one way we can continue to improve our services. Please take a few minutes to complete the written survey that you may receive in the mail after your visit with us. Thank you!             Your Updated Medication List - Protect others around you: Learn how to safely use, store and throw away your medicines at www.disposemymeds.org.          This list is accurate as of 2/12/18  5:41 PM.  Always use your most recent med list.                   Brand Name Dispense Instructions for use Diagnosis    citalopram 10 MG tablet    celeXA    90 tablet    Take 1 tablet (10 mg) by mouth daily    Anxiety       FLUoxetine 10 MG capsule    PROzac    60 capsule    Take 1 capsule (10 mg) by mouth daily For 1 week, then increase to 20 mg daily     Situational anxiety, MADELIN (generalized anxiety disorder)       hydrOXYzine 25 MG tablet    ATARAX    60 tablet    Take 1-2 tablets (25-50 mg) by mouth nightly as needed for other (insomnia)    MADELIN (generalized anxiety disorder), Situational anxiety

## 2018-02-12 NOTE — PROGRESS NOTES
SUBJECTIVE:   Fifi Hook is a 32 year old female who presents to clinic today for the following health issues:    Acute Illness   Acute illness concerns: Possible pneumonia  Onset: 8 days ago    Fever: low grade    Chills/Sweats: YES- cant tell if its cold or hot sometimes    Headache (location?): YES    Sinus Pressure:YES    Conjunctivitis:  no    Ear Pain: no    Rhinorrhea: no    Congestion: YES    Sore Throat: YES- started 8 days ago, feels like it is linked to congestion     Cough: YES    Wheeze: YES- mild     Decreased Appetite: no    Nausea: no    Vomiting: no    Diarrhea:  no    Dysuria/Freq.: no    Fatigue/Achiness: YES    Sick/Strep Exposure: YES- son has been sick     Therapies Tried and outcome: Night time cold medicine, Ibuprofen during the day      No history of RAD    Problem list and histories reviewed & adjusted, as indicated.  Additional history: as documented    Labs reviewed in EPIC    Reviewed and updated as needed this visit by clinical staff       Reviewed and updated as needed this visit by Provider         ROS:  Constitutional, HEENT, cardiovascular, pulmonary, gi and gu systems are negative, except as otherwise noted.    OBJECTIVE:     /78  Pulse 92  Temp 97.4  F (36.3  C) (Oral)  Wt 144 lb 9.6 oz (65.6 kg)  SpO2 98%  BMI 24.06 kg/m2  Body mass index is 24.06 kg/(m^2).  GENERAL: alert, fit and fatigued  HENT: normal cephalic/atraumatic, ear canals and TM's normal, nose and mouth without ulcers or lesions, nasal mucosa edematous , rhinorrhea clear, oropharynx clear and oral mucous membranes moist  NECK: no adenopathy, no asymmetry, masses, or scars and thyroid normal to palpation  RESP: no rales , no rhonchi and expiratory wheezes bilateral  CV: regular rate and rhythm, normal S1 S2, no S3 or S4, no murmur, click or rub, no peripheral edema and peripheral pulses strong  ABDOMEN: soft, nontender, no hepatosplenomegaly, no masses and bowel sounds normal  SKIN: no  suspicious lesions or rashes  NEURO: Normal strength and tone, mentation intact and speech normal    Diagnostic Test Results:  Results for orders placed or performed in visit on 02/12/18   CBC with platelets   Result Value Ref Range    WBC 7.3 4.0 - 11.0 10e9/L    RBC Count 4.90 3.8 - 5.2 10e12/L    Hemoglobin 13.4 11.7 - 15.7 g/dL    Hematocrit 41.1 35.0 - 47.0 %    MCV 84 78 - 100 fl    MCH 27.3 26.5 - 33.0 pg    MCHC 32.6 31.5 - 36.5 g/dL    RDW 13.5 10.0 - 15.0 %    Platelet Count 225 150 - 450 10e9/L   Influenza A/B antigen   Result Value Ref Range    Influenza A/B Agn Specimen Nasal     Influenza A Negative NEG^Negative    Influenza B Negative NEG^Negative       ASSESSMENT/PLAN:             1. Flu-like symptoms  Looks viral  Fluids, rest  - CBC with platelets  - Influenza A/B antigen  - XR Chest 2 Views; Future    Follow up by phone in 2 days if not improving.     Gustavo Dc MD  [facility] [place]

## 2018-02-15 ENCOUNTER — OFFICE VISIT (OUTPATIENT)
Dept: FAMILY MEDICINE | Facility: CLINIC | Age: 33
End: 2018-02-15
Payer: COMMERCIAL

## 2018-02-15 ENCOUNTER — HOSPITAL ENCOUNTER (OUTPATIENT)
Dept: GENERAL RADIOLOGY | Facility: CLINIC | Age: 33
Discharge: HOME | End: 2018-02-15
Attending: FAMILY MEDICINE | Admitting: FAMILY MEDICINE
Payer: COMMERCIAL

## 2018-02-15 VITALS — OXYGEN SATURATION: 98 % | TEMPERATURE: 98.2 F | HEART RATE: 90 BPM | BODY MASS INDEX: 23.96 KG/M2 | WEIGHT: 144 LBS

## 2018-02-15 DIAGNOSIS — J18.9 COMMUNITY ACQUIRED PNEUMONIA OF RIGHT UPPER LOBE OF LUNG: ICD-10-CM

## 2018-02-15 DIAGNOSIS — H65.91 OME (OTITIS MEDIA WITH EFFUSION), RIGHT: Primary | ICD-10-CM

## 2018-02-15 DIAGNOSIS — R68.89 FLU-LIKE SYMPTOMS: ICD-10-CM

## 2018-02-15 PROCEDURE — 99214 OFFICE O/P EST MOD 30 MIN: CPT | Performed by: FAMILY MEDICINE

## 2018-02-15 PROCEDURE — 71046 X-RAY EXAM CHEST 2 VIEWS: CPT

## 2018-02-15 RX ORDER — AZITHROMYCIN 250 MG/1
TABLET, FILM COATED ORAL
Qty: 6 TABLET | Refills: 0 | Status: SHIPPED | OUTPATIENT
Start: 2018-02-15

## 2018-02-15 RX ORDER — CODEINE PHOSPHATE AND GUAIFENESIN 10; 100 MG/5ML; MG/5ML
1 SOLUTION ORAL
Qty: 120 ML | Refills: 0 | Status: SHIPPED | OUTPATIENT
Start: 2018-02-15

## 2018-02-15 NOTE — PROGRESS NOTES
SUBJECTIVE:   Fifi Hook is a 32 year old female who presents to clinic today for the following health issues:    Acute Illness follow up from Monday   Acute illness concerns: URI  Onset: 11 days ago    Fever: YES    Chills/Sweats: YES- a little    Headache (location?): YES    Sinus Pressure:YES    Conjunctivitis:  no    Ear Pain: YES: both    Rhinorrhea: YES    Congestion: YES    Sore Throat: YES     Cough: YES    Wheeze: YES    Decreased Appetite: YES    Nausea: no     Vomiting: no     Diarrhea:  no     Dysuria/Freq.: no     Fatigue/Achiness: YES    Sick/Strep Exposure: no     Therapies Tried and outcome: Night time cold medicine and ibuprofen during the day      Non smoker  feelin g worse  Has IUD so not pregnant  Problem list and histories reviewed & adjusted, as indicated.  Additional history: as documented    Labs reviewed in EPIC    Reviewed and updated as needed this visit by clinical staff       Reviewed and updated as needed this visit by Provider         ROS:  Constitutional, HEENT, cardiovascular, pulmonary, gi and gu systems are negative, except as otherwise noted.    OBJECTIVE:     Pulse 90  Temp 98.2  F (36.8  C) (Oral)  Wt 144 lb (65.3 kg)  SpO2 98%  BMI 23.96 kg/m2  Body mass index is 23.96 kg/(m^2).  GENERAL: alert, fit and fatigued  HENT: normal cephalic/atraumatic, right ear: erythematous and bulging membrane, left ear: clear effusion, nose and mouth without ulcers or lesions, oropharynx clear and oral mucous membranes moist  NECK: no adenopathy, no asymmetry, masses, or scars and thyroid normal to palpation  RESP: no rales , rhonchi R upper anterior  and expiratory wheezes bilaterally  CV: regular rate and rhythm, normal S1 S2, no S3 or S4, no murmur, click or rub, no peripheral edema and peripheral pulses strong  ABDOMEN: soft, nontender, no hepatosplenomegaly, no masses and bowel sounds normal  SKIN: no suspicious lesions or rashes  PSYCH: mentation appears normal, affect  normal/bright    Diagnostic Test Results:  Results for orders placed or performed in visit on 02/12/18   CBC with platelets   Result Value Ref Range    WBC 7.3 4.0 - 11.0 10e9/L    RBC Count 4.90 3.8 - 5.2 10e12/L    Hemoglobin 13.4 11.7 - 15.7 g/dL    Hematocrit 41.1 35.0 - 47.0 %    MCV 84 78 - 100 fl    MCH 27.3 26.5 - 33.0 pg    MCHC 32.6 31.5 - 36.5 g/dL    RDW 13.5 10.0 - 15.0 %    Platelet Count 225 150 - 450 10e9/L   Influenza A/B antigen   Result Value Ref Range    Influenza A/B Agn Specimen Nasal     Influenza A Negative NEG^Negative    Influenza B Negative NEG^Negative     rudi get CXR  ASSESSMENT/PLAN:             1. Community acquired pneumonia of right upper lobe of lung (H)  Clinically  Start   - azithromycin (ZITHROMAX) 250 MG tablet; Two tablets first day, then one tablet daily for four days.  Dispense: 6 tablet; Refill: 0  - guaiFENesin-codeine (ROBITUSSIN AC) 100-10 MG/5ML SOLN solution; Take 5 mLs by mouth nightly as needed for cough  Dispense: 120 mL; Refill: 0    2. OME (otitis media with effusion), right  Will treat  - azithromycin (ZITHROMAX) 250 MG tablet; Two tablets first day, then one tablet daily for four days.  Dispense: 6 tablet; Refill: 0  - guaiFENesin-codeine (ROBITUSSIN AC) 100-10 MG/5ML SOLN solution; Take 5 mLs by mouth nightly as needed for cough  Dispense: 120 mL; Refill: 0    Follow up by phone in 4 days if not improving.     Gustavo Dc MD  [facility] [place]

## 2018-02-15 NOTE — MR AVS SNAPSHOT
After Visit Summary   2/15/2018    Fifi Hook    MRN: [id number]           Patient Information     Date Of Birth          1985        Visit Information        Provider Department      2/15/2018 8:30 AM [place]c [place] [facility] [place]'s Diagnoses     OME (otitis media with effusion), right    -  1    Community acquired pneumonia of right upper lobe of lung (H)           Follow-ups after your visit        Who to contact     If you have questions or need follow up information about today's clinic visit or your schedule please contact [facility] at [phone].  Normal or non-critical lab and imaging results will be communicated to you by [employer], letter or phone within 4 business days after the clinic has received the results. If you do not hear from us within 7 days, please contact the clinic through [employer] or phone. If you have a critical or abnormal lab result, we will notify you by phone as soon as possible.  Submit refill requests through [employer] or call your pharmacy and they will forward the refill request to us. Please allow 3 business days for your refill to be completed.          Additional Information About Your Visit        MyChart Information     [employer] gives you secure access to your electronic health record. If you see a primary care provider, you can also send messages to your care team and make appointments. If you have questions, please call your primary care clinic.  If you do not have a primary care provider, please call [phone] and they will assist you.        Care EveryWhere ID     This is your Care EveryWhere ID. This could be used by other organizations to access your [facility] medical records  [id number]        Your Vitals Were     Pulse Temperature Pulse Oximetry BMI (Body Mass Index)          90 98.2  F (36.8  C) (Oral) 98% 23.96 kg/m2         Blood Pressure from Last 3 Encounters:   02/12/18  110/78   01/26/18 110/72   05/17/17 98/78    Weight from Last 3 Encounters:   02/15/18 144 lb (65.3 kg)   02/12/18 144 lb 9.6 oz (65.6 kg)   01/26/18 146 lb (66.2 kg)              Today, you had the following     No orders found for display         Today's Medication Changes          These changes are accurate as of 2/15/18  9:37 AM.  If you have any questions, ask your nurse or doctor.               Start taking these medicines.        Dose/Directions    azithromycin 250 MG tablet   Commonly known as:  ZITHROMAX   Used for:  OME (otitis media with effusion), right, Community acquired pneumonia of right upper lobe of lung (H)   Started by:  Gustavo Dc MD        Two tablets first day, then one tablet daily for four days.   Quantity:  6 tablet   Refills:  0       guaiFENesin-codeine 100-10 MG/5ML Soln solution   Commonly known as:  ROBITUSSIN AC   Used for:  OME (otitis media with effusion), right, Community acquired pneumonia of right upper lobe of lung (H)   Started by:  Gustavo Dc MD        Dose:  1 tsp.   Take 5 mLs by mouth nightly as needed for cough   Quantity:  120 mL   Refills:  0         Stop taking these medicines if you haven't already. Please contact your care team if you have questions.     citalopram 10 MG tablet   Commonly known as:  celeXA   Stopped by:  Gustavo Dc MD                Where to get your medicines      These medications were sent to [address] [address]     Phone:  [phone]     azithromycin 250 MG tablet         Some of these will need a paper prescription and others can be bought over the counter.  Ask your nurse if you have questions.     Bring a paper prescription for each of these medications     guaiFENesin-codeine 100-10 MG/5ML Soln solution                Primary Care Provider Office Phone # Fax #    Chel Figueroa PA-C [phone]        [address] [facility] [address]  [facility] [address]        Equal Access to Services     BRITTANY GODINEZ : Hadii ariel ku hadmikao Soomaali, waaxda luqadaha, qaybta kaalmada langlauraandry, jones pritchett lindseytae everettabyashley mohamud. So [facility] [phone].    ATENCIÓN: Si habla español, tiene a mathew disposición servicios gratuitos de asistencia lingüística. Llame al [phone].    We comply with applicable federal civil rights laws and [place] laws. We do not discriminate on the basis of race, color, national origin, age, disability, sex, sexual orientation, or gender identity.            Thank you!     Thank you for choosing [facility]  for your care. Our goal is always to provide you with excellent care. Hearing back from our patients is one way we can continue to improve our services. Please take a few minutes to complete the written survey that you may receive in the mail after your visit with us. Thank you!             Your Updated Medication List - Protect others around you: Learn how to safely use, store and throw away your medicines at www.disposemymeds.org.          This list is accurate as of 2/15/18  9:37 AM.  Always use your most recent med list.                   Brand Name Dispense Instructions for use Diagnosis    azithromycin 250 MG tablet    ZITHROMAX    6 tablet    Two tablets first day, then one tablet daily for four days.    OME (otitis media with effusion), right, Community acquired pneumonia of right upper lobe of lung (H)       FLUoxetine 10 MG capsule    PROzac    60 capsule    Take 1 capsule (10 mg) by mouth daily For 1 week, then increase to 20 mg daily    Situational anxiety, MADELIN (generalized anxiety disorder)       guaiFENesin-codeine 100-10 MG/5ML Soln solution    ROBITUSSIN AC    120 mL    Take 5 mLs by mouth nightly as needed for cough    OME (otitis media with effusion), right, Community acquired pneumonia of right upper lobe of lung (H)       hydrOXYzine 25 MG tablet    ATARAX     60 tablet    Take 1-2 tablets (25-50 mg) by mouth nightly as needed for other (insomnia)    MADELIN (generalized anxiety disorder), Situational anxiety

## 2018-03-14 NOTE — ANESTHESIA POSTPROCEDURE EVALUATION
Patient: Fifi Hook    Procedure(s):   - Wound Class: I-Clean    Diagnosis:pregnency  Diagnosis Additional Information: No value filed.    Anesthesia Type:  Spinal, Periph. Nerve Block for postop pain    Note:  Anesthesia Post Evaluation    Patient location during evaluation: OB PACU  Patient participation: Able to fully participate in evaluation  Level of consciousness: awake and alert  Pain management: adequate  Airway patency: patent  Cardiovascular status: hemodynamically stable  Respiratory status: nonlabored ventilation, spontaneous ventilation and room air  Hydration status: euvolemic  PONV: none     Anesthetic complications: None          Last vitals:  Vitals:    02/25/17 2015 02/25/17 2021 02/25/17 2030   BP:   118/81   Pulse:      Resp: 16 16 17   Temp: 36.7  C (98.1  F)  36.7  C (98  F)   SpO2: 93%           Electronically Signed By: Cezar Ying MD  February 25, 2017  8:48 PM  
avoid or limit if in pain or if increasing vaginal bleeding

## 2018-05-03 DIAGNOSIS — F41.1 GAD (GENERALIZED ANXIETY DISORDER): Primary | ICD-10-CM

## 2020-03-10 ENCOUNTER — HEALTH MAINTENANCE LETTER (OUTPATIENT)
Age: 35
End: 2020-03-10

## 2020-09-25 NOTE — PROGRESS NOTES
Addended by: ABRAHAN BENITEZ on: 9/25/2020 10:44 AM     Modules accepted: Orders     Discussed pt status with CNM throughout shift. She has reviewed strip numerous times throughout shift. No concerns. No further orders given. Asked about additional IVF bolus, however, no orders given. Will continue to update provider and reposition pt.

## 2020-12-27 ENCOUNTER — HEALTH MAINTENANCE LETTER (OUTPATIENT)
Age: 35
End: 2020-12-27

## 2021-04-24 ENCOUNTER — HEALTH MAINTENANCE LETTER (OUTPATIENT)
Age: 36
End: 2021-04-24

## 2021-10-09 ENCOUNTER — HEALTH MAINTENANCE LETTER (OUTPATIENT)
Age: 36
End: 2021-10-09

## 2022-05-16 ENCOUNTER — HEALTH MAINTENANCE LETTER (OUTPATIENT)
Age: 37
End: 2022-05-16

## 2022-06-28 NOTE — PROVIDER NOTIFICATION
02/25/17 1000   Uterine Activity   Monitor [place]   Contraction Frequency (minutes) 2-4   Contraction Duration (seconds) 40-80   Contraction Quality Moderate   Resting Tone Palpated Soft   FHR   Monitor External US   Variability < or equal to 5 BPM   Baseline Rate (Fetus A) 150 bpm   Baseline Classification Normal   Accelerations Not present   Decelerations PD;LD  (PDx1)     Continuing to reposition patient frequently. CNM to do SVE @ 1000.     · CTA chest 6/22: "patchy groundglass patchy airspace disease throughout RUL consistent with acute infiltrate"   · S/p 4 days of IV ceftriaxone and 1 day azithromycin, now on PO vantin with last dose 6/28

## 2022-09-11 ENCOUNTER — HEALTH MAINTENANCE LETTER (OUTPATIENT)
Age: 37
End: 2022-09-11

## 2023-05-31 NOTE — PROGRESS NOTES
Fetal Non-Stress Test Results    NST Ordered By: Isamar Aldridge CNM       NST Start & Stop Times  2/24/17 start 2230 2/24/17 end 2250         NST Results  Fetus A   Baseline Rate: 150  Accelerations: Present  Decelerations: None  Interpretation: reactive  Isamar Aldridge CNM         36.8

## 2023-06-03 ENCOUNTER — HEALTH MAINTENANCE LETTER (OUTPATIENT)
Age: 38
End: 2023-06-03

## (undated) DEVICE — GLOVE PROTEXIS BLUE W/NEU-THERA 6.5  2D73EB65

## (undated) DEVICE — PACK C-SECTION LF PL15OTA83B

## (undated) DEVICE — DRAPE IOBAN C-SECTION W/POUCH 30X35" 6657

## (undated) DEVICE — SOL NACL 0.9% IRRIG 1000ML BOTTLE 07138-09

## (undated) DEVICE — SUCTION CANISTER MEDIVAC LINER 1500ML W/LID 65651-515

## (undated) DEVICE — BARRIER SEPRAFILM 5X6" SINGLE SHEET 4301-02

## (undated) DEVICE — SU DERMABOND ADVANCED .7ML DNX12

## (undated) DEVICE — STOCKING SLEEVE COMPRESSION CALF LG

## (undated) DEVICE — PREP CHLORAPREP 26ML TINTED ORANGE  260815

## (undated) DEVICE — SOL WATER IRRIG 1000ML BOTTLE 07139-09

## (undated) DEVICE — BASIN SET MAJOR

## (undated) DEVICE — ESU GROUND PAD UNIVERSAL W/O CORD

## (undated) DEVICE — STRAP KNEE/BODY 31143004

## (undated) DEVICE — SU MONOCRYL 0 CTB-1 36" YB946

## (undated) DEVICE — CATH TRAY FOLEY 16FR SILICONE 907416

## (undated) DEVICE — GLOVE ESTEEM POWDER FREE SMT 6.0  2D72PT60

## (undated) DEVICE — SU VICRYL 0 CT-1 36" J346H

## (undated) RX ORDER — OXYTOCIN/0.9 % SODIUM CHLORIDE 30/500 ML
PLASTIC BAG, INJECTION (ML) INTRAVENOUS
Status: DISPENSED
Start: 2017-02-25

## (undated) RX ORDER — KETOROLAC TROMETHAMINE 30 MG/ML
INJECTION, SOLUTION INTRAMUSCULAR; INTRAVENOUS
Status: DISPENSED
Start: 2017-02-25